# Patient Record
Sex: FEMALE | Race: BLACK OR AFRICAN AMERICAN | NOT HISPANIC OR LATINO | Employment: OTHER | URBAN - NONMETROPOLITAN AREA
[De-identification: names, ages, dates, MRNs, and addresses within clinical notes are randomized per-mention and may not be internally consistent; named-entity substitution may affect disease eponyms.]

---

## 2017-07-27 ENCOUNTER — TRANSCRIBE ORDERS (OUTPATIENT)
Dept: LAB | Facility: HOSPITAL | Age: 67
End: 2017-07-27

## 2017-07-27 ENCOUNTER — LAB (OUTPATIENT)
Dept: LAB | Facility: HOSPITAL | Age: 67
End: 2017-07-27

## 2017-07-27 DIAGNOSIS — Z79.899 DRUG THERAPY: Primary | ICD-10-CM

## 2017-07-27 DIAGNOSIS — Z79.899 DRUG THERAPY: ICD-10-CM

## 2017-07-27 LAB
ALBUMIN SERPL-MCNC: 4.9 G/DL (ref 3.5–5)
ALBUMIN/GLOB SERPL: 1.5 G/DL (ref 1–2)
ALP SERPL-CCNC: 95 U/L (ref 38–126)
ALT SERPL W P-5'-P-CCNC: 27 U/L (ref 13–69)
ANION GAP SERPL CALCULATED.3IONS-SCNC: 16.1 MMOL/L
AST SERPL-CCNC: 24 U/L (ref 15–46)
BASOPHILS # BLD AUTO: 0.03 10*3/MM3 (ref 0–0.2)
BASOPHILS NFR BLD AUTO: 0.6 % (ref 0–2.5)
BILIRUB SERPL-MCNC: 1.9 MG/DL (ref 0.2–1.3)
BUN BLD-MCNC: 14 MG/DL (ref 7–20)
BUN/CREAT SERPL: 17.5 (ref 7.1–23.5)
CALCIUM SPEC-SCNC: 10.3 MG/DL (ref 8.4–10.2)
CHLORIDE SERPL-SCNC: 108 MMOL/L (ref 98–107)
CHOLEST SERPL-MCNC: 232 MG/DL (ref 0–199)
CO2 SERPL-SCNC: 24 MMOL/L (ref 26–30)
CREAT BLD-MCNC: 0.8 MG/DL (ref 0.6–1.3)
DEPRECATED RDW RBC AUTO: 46.6 FL (ref 37–54)
EOSINOPHIL # BLD AUTO: 0.13 10*3/MM3 (ref 0–0.7)
EOSINOPHIL NFR BLD AUTO: 2.7 % (ref 0–7)
ERYTHROCYTE [DISTWIDTH] IN BLOOD BY AUTOMATED COUNT: 14.6 % (ref 11.5–14.5)
GFR SERPL CREATININE-BSD FRML MDRD: 87 ML/MIN/1.73
GLOBULIN UR ELPH-MCNC: 3.2 GM/DL
GLUCOSE BLD-MCNC: 78 MG/DL (ref 74–98)
HCT VFR BLD AUTO: 40.3 % (ref 37–47)
HDLC SERPL-MCNC: 102 MG/DL (ref 40–60)
HGB BLD-MCNC: 12.4 G/DL (ref 12–16)
IMM GRANULOCYTES # BLD: 0.02 10*3/MM3 (ref 0–0.06)
IMM GRANULOCYTES NFR BLD: 0.4 % (ref 0–0.6)
LDLC SERPL CALC-MCNC: 110 MG/DL (ref 0–99)
LDLC/HDLC SERPL: 1.08 {RATIO}
LYMPHOCYTES # BLD AUTO: 1.68 10*3/MM3 (ref 0.6–3.4)
LYMPHOCYTES NFR BLD AUTO: 35.4 % (ref 10–50)
MCH RBC QN AUTO: 26.7 PG (ref 27–31)
MCHC RBC AUTO-ENTMCNC: 30.8 G/DL (ref 30–37)
MCV RBC AUTO: 86.7 FL (ref 81–99)
MONOCYTES # BLD AUTO: 0.48 10*3/MM3 (ref 0–0.9)
MONOCYTES NFR BLD AUTO: 10.1 % (ref 0–12)
NEUTROPHILS # BLD AUTO: 2.41 10*3/MM3 (ref 2–6.9)
NEUTROPHILS NFR BLD AUTO: 50.8 % (ref 37–80)
NRBC BLD MANUAL-RTO: 0 /100 WBC (ref 0–0)
PLATELET # BLD AUTO: 101 10*3/MM3 (ref 130–400)
PMV BLD AUTO: 12.7 FL (ref 6–12)
POTASSIUM BLD-SCNC: 4.1 MMOL/L (ref 3.5–5.1)
PROT SERPL-MCNC: 8.1 G/DL (ref 6.3–8.2)
RBC # BLD AUTO: 4.65 10*6/MM3 (ref 4.2–5.4)
SODIUM BLD-SCNC: 144 MMOL/L (ref 137–145)
TRIGL SERPL-MCNC: 98 MG/DL
VLDLC SERPL-MCNC: 19.6 MG/DL
WBC NRBC COR # BLD: 4.75 10*3/MM3 (ref 4.8–10.8)

## 2017-07-27 PROCEDURE — 85025 COMPLETE CBC W/AUTO DIFF WBC: CPT | Performed by: INTERNAL MEDICINE

## 2017-07-27 PROCEDURE — 80053 COMPREHEN METABOLIC PANEL: CPT | Performed by: INTERNAL MEDICINE

## 2017-07-27 PROCEDURE — 36415 COLL VENOUS BLD VENIPUNCTURE: CPT

## 2017-07-27 PROCEDURE — 80061 LIPID PANEL: CPT | Performed by: INTERNAL MEDICINE

## 2017-11-06 ENCOUNTER — TRANSCRIBE ORDERS (OUTPATIENT)
Dept: LAB | Facility: HOSPITAL | Age: 67
End: 2017-11-06

## 2017-11-06 DIAGNOSIS — Z51.81 ENCOUNTER FOR THERAPEUTIC DRUG MONITORING: Primary | ICD-10-CM

## 2017-11-30 ENCOUNTER — LAB (OUTPATIENT)
Dept: LAB | Facility: HOSPITAL | Age: 67
End: 2017-11-30

## 2017-11-30 DIAGNOSIS — Z51.81 ENCOUNTER FOR THERAPEUTIC DRUG MONITORING: ICD-10-CM

## 2017-11-30 LAB
DEPRECATED RDW RBC AUTO: 50.3 FL (ref 37–54)
ERYTHROCYTE [DISTWIDTH] IN BLOOD BY AUTOMATED COUNT: 15.4 % (ref 11.5–14.5)
HCT VFR BLD AUTO: 37.9 % (ref 37–47)
HGB BLD-MCNC: 11.7 G/DL (ref 12–16)
MCH RBC QN AUTO: 27.5 PG (ref 27–31)
MCHC RBC AUTO-ENTMCNC: 30.9 G/DL (ref 30–37)
MCV RBC AUTO: 89.2 FL (ref 81–99)
PLATELET # BLD AUTO: 234 10*3/MM3 (ref 130–400)
PMV BLD AUTO: 10.8 FL (ref 6–12)
RBC # BLD AUTO: 4.25 10*6/MM3 (ref 4.2–5.4)
WBC NRBC COR # BLD: 6.05 10*3/MM3 (ref 4.8–10.8)

## 2017-11-30 PROCEDURE — 85027 COMPLETE CBC AUTOMATED: CPT

## 2017-11-30 PROCEDURE — 36415 COLL VENOUS BLD VENIPUNCTURE: CPT

## 2017-12-13 ENCOUNTER — TRANSCRIBE ORDERS (OUTPATIENT)
Dept: ADMINISTRATIVE | Facility: HOSPITAL | Age: 67
End: 2017-12-13

## 2017-12-13 DIAGNOSIS — M06.9 RHEUMATOID ARTHRITIS OF KNEE (HCC): Primary | ICD-10-CM

## 2018-01-02 ENCOUNTER — APPOINTMENT (OUTPATIENT)
Dept: LAB | Facility: HOSPITAL | Age: 68
End: 2018-01-02

## 2018-01-02 LAB
ALBUMIN SERPL-MCNC: 4.5 G/DL (ref 3.5–5)
ALP SERPL-CCNC: 82 U/L (ref 38–126)
ALT SERPL W P-5'-P-CCNC: 23 U/L (ref 13–69)
AST SERPL-CCNC: 19 U/L (ref 15–46)
BILIRUB CONJ SERPL-MCNC: 0.2 MG/DL (ref 0–0.4)
BILIRUB INDIRECT SERPL-MCNC: 1.1 MG/DL
BILIRUB SERPL-MCNC: 1.3 MG/DL (ref 0.2–1.3)
PROT SERPL-MCNC: 7.7 G/DL (ref 6.3–8.2)

## 2018-01-02 PROCEDURE — 80076 HEPATIC FUNCTION PANEL: CPT | Performed by: INTERNAL MEDICINE

## 2018-01-02 PROCEDURE — 36415 COLL VENOUS BLD VENIPUNCTURE: CPT | Performed by: INTERNAL MEDICINE

## 2018-06-23 ENCOUNTER — HOSPITAL ENCOUNTER (EMERGENCY)
Facility: HOSPITAL | Age: 68
Discharge: HOME | End: 2018-06-23
Attending: EMERGENCY MEDICINE | Admitting: EMERGENCY MEDICINE

## 2018-06-23 ENCOUNTER — APPOINTMENT (OUTPATIENT)
Dept: GENERAL RADIOLOGY | Facility: HOSPITAL | Age: 68
End: 2018-06-23

## 2018-06-23 ENCOUNTER — APPOINTMENT (OUTPATIENT)
Dept: CT IMAGING | Facility: HOSPITAL | Age: 68
End: 2018-06-23

## 2018-06-23 VITALS
RESPIRATION RATE: 20 BRPM | SYSTOLIC BLOOD PRESSURE: 120 MMHG | HEART RATE: 101 BPM | OXYGEN SATURATION: 98 % | WEIGHT: 135 LBS | DIASTOLIC BLOOD PRESSURE: 90 MMHG | TEMPERATURE: 99.4 F | HEIGHT: 61 IN | BODY MASS INDEX: 25.49 KG/M2

## 2018-06-23 DIAGNOSIS — S16.1XXA CERVICAL STRAIN, ACUTE, INITIAL ENCOUNTER: ICD-10-CM

## 2018-06-23 DIAGNOSIS — R51.9 ACUTE NONINTRACTABLE HEADACHE, UNSPECIFIED HEADACHE TYPE: ICD-10-CM

## 2018-06-23 DIAGNOSIS — S39.012A LUMBAR STRAIN, INITIAL ENCOUNTER: ICD-10-CM

## 2018-06-23 DIAGNOSIS — S63.502A LEFT WRIST SPRAIN, INITIAL ENCOUNTER: ICD-10-CM

## 2018-06-23 DIAGNOSIS — R53.1 GENERALIZED WEAKNESS: ICD-10-CM

## 2018-06-23 DIAGNOSIS — S40.012A CONTUSION OF LEFT SHOULDER, INITIAL ENCOUNTER: ICD-10-CM

## 2018-06-23 DIAGNOSIS — R31.9 URINARY TRACT INFECTION WITH HEMATURIA, SITE UNSPECIFIED: ICD-10-CM

## 2018-06-23 DIAGNOSIS — N39.0 URINARY TRACT INFECTION WITH HEMATURIA, SITE UNSPECIFIED: ICD-10-CM

## 2018-06-23 DIAGNOSIS — S70.02XA CONTUSION OF LEFT HIP, INITIAL ENCOUNTER: ICD-10-CM
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
## 2018-06-23 LAB
ALBUMIN SERPL-MCNC: 3.7 G/DL (ref 3.5–5)
ALBUMIN/GLOB SERPL: 1 G/DL (ref 1–2)
ALP SERPL-CCNC: 85 U/L (ref 38–126)
ALT SERPL W P-5'-P-CCNC: 30 U/L (ref 13–69)
ANION GAP SERPL CALCULATED.3IONS-SCNC: 17.3 MMOL/L (ref 10–20)
AST SERPL-CCNC: 31 U/L (ref 15–46)
BACTERIA UR QL AUTO: ABNORMAL /HPF
BASOPHILS # BLD AUTO: 0.04 10*3/MM3 (ref 0–0.2)
BASOPHILS NFR BLD AUTO: 0.4 % (ref 0–2.5)
BILIRUB SERPL-MCNC: 1.3 MG/DL (ref 0.2–1.3)
BILIRUB UR QL STRIP: NEGATIVE
BUN BLD-MCNC: 18 MG/DL (ref 7–20)
BUN/CREAT SERPL: 25.7 (ref 7.1–23.5)
CALCIUM SPEC-SCNC: 9.5 MG/DL (ref 8.4–10.2)
CHLORIDE SERPL-SCNC: 98 MMOL/L (ref 98–107)
CK SERPL-CCNC: 34 U/L (ref 30–170)
CLARITY UR: ABNORMAL
CO2 SERPL-SCNC: 25 MMOL/L (ref 26–30)
COLOR UR: YELLOW
CREAT BLD-MCNC: 0.7 MG/DL (ref 0.6–1.3)
DEPRECATED RDW RBC AUTO: 44.8 FL (ref 37–54)
EOSINOPHIL # BLD AUTO: 0.21 10*3/MM3 (ref 0–0.7)
EOSINOPHIL NFR BLD AUTO: 2.2 % (ref 0–7)
ERYTHROCYTE [DISTWIDTH] IN BLOOD BY AUTOMATED COUNT: 14.2 % (ref 11.5–14.5)
GFR SERPL CREATININE-BSD FRML MDRD: 101 ML/MIN/1.73
GLOBULIN UR ELPH-MCNC: 3.7 GM/DL
GLUCOSE BLD-MCNC: 88 MG/DL (ref 74–98)
GLUCOSE UR STRIP-MCNC: NEGATIVE MG/DL
HCT VFR BLD AUTO: 31.8 % (ref 37–47)
HGB BLD-MCNC: 10.4 G/DL (ref 12–16)
HGB UR QL STRIP.AUTO: ABNORMAL
HYALINE CASTS UR QL AUTO: ABNORMAL /LPF
IMM GRANULOCYTES # BLD: 0.19 10*3/MM3 (ref 0–0.06)
IMM GRANULOCYTES NFR BLD: 2 % (ref 0–0.6)
KETONES UR QL STRIP: ABNORMAL
LEUKOCYTE ESTERASE UR QL STRIP.AUTO: ABNORMAL
LYMPHOCYTES # BLD AUTO: 1.47 10*3/MM3 (ref 0.6–3.4)
LYMPHOCYTES NFR BLD AUTO: 15.3 % (ref 10–50)
MCH RBC QN AUTO: 28.4 PG (ref 27–31)
MCHC RBC AUTO-ENTMCNC: 32.7 G/DL (ref 30–37)
MCV RBC AUTO: 86.9 FL (ref 81–99)
MONOCYTES # BLD AUTO: 1.05 10*3/MM3 (ref 0–0.9)
MONOCYTES NFR BLD AUTO: 10.9 % (ref 0–12)
MYOGLOBIN SERPL-MCNC: 29.5 NG/ML (ref 0–101)
NEUTROPHILS # BLD AUTO: 6.65 10*3/MM3 (ref 2–6.9)
NEUTROPHILS NFR BLD AUTO: 69.2 % (ref 37–80)
NITRITE UR QL STRIP: POSITIVE
NRBC BLD MANUAL-RTO: 0 /100 WBC (ref 0–0)
PH UR STRIP.AUTO: 5.5 [PH] (ref 5–8)
PLATELET # BLD AUTO: 292 10*3/MM3 (ref 130–400)
PMV BLD AUTO: 9 FL (ref 6–12)
POTASSIUM BLD-SCNC: 4.3 MMOL/L (ref 3.5–5.1)
PROT SERPL-MCNC: 7.4 G/DL (ref 6.3–8.2)
PROT UR QL STRIP: ABNORMAL
RBC # BLD AUTO: 3.66 10*6/MM3 (ref 4.2–5.4)
RBC # UR: ABNORMAL /HPF
REF LAB TEST METHOD: ABNORMAL
SODIUM BLD-SCNC: 136 MMOL/L (ref 137–145)
SP GR UR STRIP: 1.02 (ref 1–1.03)
SQUAMOUS #/AREA URNS HPF: ABNORMAL /HPF
TROPONIN I SERPL-MCNC: <0.012 NG/ML (ref 0–0.03)
UROBILINOGEN UR QL STRIP: ABNORMAL
WBC NRBC COR # BLD: 9.61 10*3/MM3 (ref 4.8–10.8)
WBC UR QL AUTO: ABNORMAL /HPF

## 2018-06-23 PROCEDURE — 81001 URINALYSIS AUTO W/SCOPE: CPT | Performed by: PHYSICIAN ASSISTANT

## 2018-06-23 PROCEDURE — 83874 ASSAY OF MYOGLOBIN: CPT | Performed by: PHYSICIAN ASSISTANT

## 2018-06-23 PROCEDURE — 73030 X-RAY EXAM OF SHOULDER: CPT

## 2018-06-23 PROCEDURE — 72128 CT CHEST SPINE W/O DYE: CPT

## 2018-06-23 PROCEDURE — 96360 HYDRATION IV INFUSION INIT: CPT

## 2018-06-23 PROCEDURE — 99284 EMERGENCY DEPT VISIT MOD MDM: CPT

## 2018-06-23 PROCEDURE — 87077 CULTURE AEROBIC IDENTIFY: CPT | Performed by: PHYSICIAN ASSISTANT

## 2018-06-23 PROCEDURE — 72125 CT NECK SPINE W/O DYE: CPT

## 2018-06-23 PROCEDURE — 80053 COMPREHEN METABOLIC PANEL: CPT | Performed by: PHYSICIAN ASSISTANT

## 2018-06-23 PROCEDURE — 82550 ASSAY OF CK (CPK): CPT | Performed by: PHYSICIAN ASSISTANT

## 2018-06-23 PROCEDURE — 93005 ELECTROCARDIOGRAM TRACING: CPT | Performed by: PHYSICIAN ASSISTANT

## 2018-06-23 PROCEDURE — 85025 COMPLETE CBC W/AUTO DIFF WBC: CPT | Performed by: PHYSICIAN ASSISTANT

## 2018-06-23 PROCEDURE — 87186 SC STD MICRODIL/AGAR DIL: CPT | Performed by: PHYSICIAN ASSISTANT

## 2018-06-23 PROCEDURE — 73130 X-RAY EXAM OF HAND: CPT

## 2018-06-23 PROCEDURE — 73502 X-RAY EXAM HIP UNI 2-3 VIEWS: CPT

## 2018-06-23 PROCEDURE — 71045 X-RAY EXAM CHEST 1 VIEW: CPT

## 2018-06-23 PROCEDURE — 70450 CT HEAD/BRAIN W/O DYE: CPT

## 2018-06-23 PROCEDURE — 84484 ASSAY OF TROPONIN QUANT: CPT | Performed by: PHYSICIAN ASSISTANT

## 2018-06-23 PROCEDURE — 72131 CT LUMBAR SPINE W/O DYE: CPT

## 2018-06-23 PROCEDURE — 87086 URINE CULTURE/COLONY COUNT: CPT | Performed by: PHYSICIAN ASSISTANT

## 2018-06-23 RX ORDER — IBUPROFEN 400 MG/1
600 TABLET ORAL EVERY 8 HOURS PRN
Qty: 20 TABLET | Refills: 0 | OUTPATIENT
Start: 2018-06-23 | End: 2019-12-31 | Stop reason: HOSPADM

## 2018-06-23 RX ORDER — NITROFURANTOIN 25; 75 MG/1; MG/1
100 CAPSULE ORAL 2 TIMES DAILY
Qty: 14 CAPSULE | Refills: 0 | OUTPATIENT
Start: 2018-06-23 | End: 2019-12-31 | Stop reason: HOSPADM

## 2018-06-23 RX ORDER — HYDROCODONE BITARTRATE AND ACETAMINOPHEN 7.5; 325 MG/1; MG/1
1 TABLET ORAL ONCE
Status: COMPLETED | OUTPATIENT
Start: 2018-06-23 | End: 2018-06-23

## 2018-06-23 RX ADMIN — HYDROCODONE BITARTRATE AND ACETAMINOPHEN 1 TABLET: 7.5; 325 TABLET ORAL at 15:59

## 2018-06-23 RX ADMIN — SODIUM CHLORIDE 1000 ML: 9 INJECTION, SOLUTION INTRAVENOUS at 16:10

## 2018-06-25 LAB — BACTERIA SPEC AEROBE CULT: ABNORMAL

## 2019-07-29 ENCOUNTER — TRANSCRIBE ORDERS (OUTPATIENT)
Dept: ADMINISTRATIVE | Facility: HOSPITAL | Age: 69
End: 2019-07-29

## 2019-07-29 DIAGNOSIS — Z12.39 ENCOUNTER FOR SCREENING FOR MALIGNANT NEOPLASM OF BREAST: ICD-10-CM

## 2019-07-29 DIAGNOSIS — M81.8 OSTEOPOROSIS OF DISUSE: Primary | ICD-10-CM

## 2019-09-24 ENCOUNTER — APPOINTMENT (OUTPATIENT)
Dept: BONE DENSITY | Facility: HOSPITAL | Age: 69
End: 2019-09-24

## 2019-09-24 ENCOUNTER — APPOINTMENT (OUTPATIENT)
Dept: MAMMOGRAPHY | Facility: HOSPITAL | Age: 69
End: 2019-09-24

## 2019-11-22 ENCOUNTER — APPOINTMENT (OUTPATIENT)
Dept: BONE DENSITY | Facility: HOSPITAL | Age: 69
End: 2019-11-22

## 2019-11-22 ENCOUNTER — APPOINTMENT (OUTPATIENT)
Dept: MAMMOGRAPHY | Facility: HOSPITAL | Age: 69
End: 2019-11-22

## 2019-12-04 ENCOUNTER — HOSPITAL ENCOUNTER (EMERGENCY)
Facility: HOSPITAL | Age: 69
Discharge: HOME | End: 2019-12-04
Attending: EMERGENCY MEDICINE | Admitting: EMERGENCY MEDICINE

## 2019-12-04 ENCOUNTER — APPOINTMENT (OUTPATIENT)
Dept: GENERAL RADIOLOGY | Facility: HOSPITAL | Age: 69
End: 2019-12-04

## 2019-12-04 VITALS
DIASTOLIC BLOOD PRESSURE: 97 MMHG | SYSTOLIC BLOOD PRESSURE: 172 MMHG | HEART RATE: 92 BPM | OXYGEN SATURATION: 97 % | WEIGHT: 126 LBS | TEMPERATURE: 98.4 F | RESPIRATION RATE: 18 BRPM | BODY MASS INDEX: 23.19 KG/M2 | HEIGHT: 62 IN

## 2019-12-04 DIAGNOSIS — M25.471 PAIN AND SWELLING OF RIGHT ANKLE: Primary | ICD-10-CM

## 2019-12-04 DIAGNOSIS — M25.571 PAIN AND SWELLING OF RIGHT ANKLE: Primary | ICD-10-CM

## 2019-12-04 PROCEDURE — 73610 X-RAY EXAM OF ANKLE: CPT

## 2019-12-04 PROCEDURE — 99283 EMERGENCY DEPT VISIT LOW MDM: CPT

## 2019-12-04 RX ORDER — HYDROCODONE BITARTRATE AND ACETAMINOPHEN 5; 325 MG/1; MG/1
1 TABLET ORAL ONCE
Status: COMPLETED | OUTPATIENT
Start: 2019-12-04 | End: 2019-12-04

## 2019-12-04 RX ORDER — TRAMADOL HYDROCHLORIDE 50 MG/1
50 TABLET ORAL EVERY 6 HOURS PRN
Qty: 10 TABLET | Refills: 0 | OUTPATIENT
Start: 2019-12-04 | End: 2019-12-31 | Stop reason: HOSPADM

## 2019-12-04 RX ADMIN — HYDROCODONE BITARTRATE AND ACETAMINOPHEN 1 TABLET: 5; 325 TABLET ORAL at 21:05

## 2019-12-05 NOTE — ED PROVIDER NOTES
"Subjective   69-year-old female presenting with ankle swelling.  She is HF the last week she has had swelling and pain to her right ankle.  No injury or trauma that she remembers.  She was seen at  where her rheumatologist is the onset of symptoms, was told that she may have a \"hairline fracture, was started on a course of prednisone which seemed to help some but now the pain is increased again.  She denies any fevers, chills, nausea, vomiting or other complaints.  No numbness or weakness.            Review of Systems   Constitutional: Negative.    HENT: Negative.    Eyes: Negative.    Respiratory: Negative.    Cardiovascular: Negative.    Gastrointestinal: Negative.    Genitourinary: Negative.    Musculoskeletal: Positive for arthralgias and joint swelling.   Skin: Negative.    Neurological: Negative.    Psychiatric/Behavioral: Negative.        Past Medical History:   Diagnosis Date   • Arthritis    • Rheumatoid arthritis (CMS/HCC)        Allergies   Allergen Reactions   • Coconut Oil Hives   • Penicillins Itching   • Sulfa Antibiotics Itching       Past Surgical History:   Procedure Laterality Date   • BREAST SURGERY     • HYSTERECTOMY     • SHOULDER SURGERY Right        History reviewed. No pertinent family history.    Social History     Socioeconomic History   • Marital status: Single     Spouse name: Not on file   • Number of children: Not on file   • Years of education: Not on file   • Highest education level: Not on file   Tobacco Use   • Smoking status: Never Smoker   • Smokeless tobacco: Never Used   Substance and Sexual Activity   • Alcohol use: No   • Drug use: No   • Sexual activity: Defer           Objective   Physical Exam   Constitutional: She is oriented to person, place, and time. She appears well-developed and well-nourished. No distress.   HENT:   Head: Normocephalic and atraumatic.   Right Ear: External ear normal.   Left Ear: External ear normal.   Nose: Nose normal.   Mouth/Throat: Oropharynx " is clear and moist.   Eyes: Conjunctivae and EOM are normal. Pupils are equal, round, and reactive to light.   Neck: Normal range of motion. Neck supple.   Cardiovascular: Normal rate, regular rhythm, normal heart sounds and intact distal pulses.   2+ DPs   Pulmonary/Chest: Effort normal and breath sounds normal. No respiratory distress.   Abdominal: Soft. Bowel sounds are normal. She exhibits no distension. There is no tenderness. There is no rebound and no guarding.   Musculoskeletal: Normal range of motion. She exhibits no deformity.   Mild swelling and tenderness to right ankle, all other extremities/joints stable without tenderness   Neurological: She is alert and oriented to person, place, and time.   Normal strength and sensation   Skin: Skin is warm and dry. No rash noted.   No warmth or erythema to the ankle   Psychiatric: She has a normal mood and affect. Her behavior is normal.   Nursing note and vitals reviewed.      Procedures           ED Course            MDM  Number of Diagnoses or Management Options  Pain and swelling of right ankle:   Diagnosis management comments: 69 year old female with subacute ankle pain and swelling.  Well-developed, well-nourished lady in no distress with exam as above.  She is neurovascular intact.  Will repeat x-ray and treat pain.  Disposition pending work-up.    DDX: Musculoskeletal pain, strain, fracture    X-ray per my interpretation reveals chronic changes, no obvious acute abnormality.  I placed her into a boot, she has a walker at home.  Will discharge home with outpatient follow-up.       Amount and/or Complexity of Data Reviewed  Tests in the radiology section of CPT®: reviewed        Final diagnoses:   Pain and swelling of right ankle              Maximus Ramey MD  12/04/19 6695

## 2019-12-18 ENCOUNTER — TRANSCRIBE ORDERS (OUTPATIENT)
Dept: LAB | Facility: HOSPITAL | Age: 69
End: 2019-12-18

## 2019-12-18 ENCOUNTER — LAB (OUTPATIENT)
Dept: LAB | Facility: HOSPITAL | Age: 69
End: 2019-12-18

## 2019-12-18 DIAGNOSIS — M25.472 LEFT ANKLE EFFUSION: Primary | ICD-10-CM

## 2019-12-18 DIAGNOSIS — M25.472 LEFT ANKLE EFFUSION: ICD-10-CM

## 2019-12-18 LAB
ALBUMIN SERPL-MCNC: 3.8 G/DL (ref 3.5–5.2)
ALBUMIN/GLOB SERPL: 0.9 G/DL
ALP SERPL-CCNC: 68 U/L (ref 39–117)
ALT SERPL W P-5'-P-CCNC: 17 U/L (ref 1–33)
ANION GAP SERPL CALCULATED.3IONS-SCNC: 14.9 MMOL/L (ref 5–15)
ANISOCYTOSIS BLD QL: NORMAL
AST SERPL-CCNC: 20 U/L (ref 1–32)
BILIRUB SERPL-MCNC: 0.5 MG/DL (ref 0.2–1.2)
BUN BLD-MCNC: 11 MG/DL (ref 8–23)
BUN/CREAT SERPL: 24.4 (ref 7–25)
CALCIUM SPEC-SCNC: 9.3 MG/DL (ref 8.6–10.5)
CHLORIDE SERPL-SCNC: 107 MMOL/L (ref 98–107)
CHROMATIN AB SERPL-ACNC: 127.6 IU/ML (ref 0–14)
CO2 SERPL-SCNC: 24.1 MMOL/L (ref 22–29)
CREAT BLD-MCNC: 0.45 MG/DL (ref 0.57–1)
CRP SERPL-MCNC: 1.02 MG/DL (ref 0–0.5)
DEPRECATED RDW RBC AUTO: 54.3 FL (ref 37–54)
EOSINOPHIL # BLD MANUAL: 0.1 10*3/MM3 (ref 0–0.4)
EOSINOPHIL NFR BLD MANUAL: 1 % (ref 0.3–6.2)
ERYTHROCYTE [DISTWIDTH] IN BLOOD BY AUTOMATED COUNT: 20.4 % (ref 12.3–15.4)
ERYTHROCYTE [SEDIMENTATION RATE] IN BLOOD: 82 MM/HR (ref 0–30)
GFR SERPL CREATININE-BSD FRML MDRD: >150 ML/MIN/1.73
GLOBULIN UR ELPH-MCNC: 4.2 GM/DL
GLUCOSE BLD-MCNC: 86 MG/DL (ref 65–99)
HCT VFR BLD AUTO: 29.6 % (ref 34–46.6)
HGB BLD-MCNC: 9.3 G/DL (ref 12–15.9)
HYPOCHROMIA BLD QL: NORMAL
LYMPHOCYTES # BLD MANUAL: 2.56 10*3/MM3 (ref 0.7–3.1)
LYMPHOCYTES NFR BLD MANUAL: 25.5 % (ref 19.6–45.3)
LYMPHOCYTES NFR BLD MANUAL: 5.9 % (ref 5–12)
MCH RBC QN AUTO: 23.3 PG (ref 26.6–33)
MCHC RBC AUTO-ENTMCNC: 31.4 G/DL (ref 31.5–35.7)
MCV RBC AUTO: 74.2 FL (ref 79–97)
MICROCYTES BLD QL: NORMAL
MONOCYTES # BLD AUTO: 0.59 10*3/MM3 (ref 0.1–0.9)
NEUTROPHILS # BLD AUTO: 6.77 10*3/MM3 (ref 1.7–7)
NEUTROPHILS NFR BLD MANUAL: 67.6 % (ref 42.7–76)
PLAT MORPH BLD: NORMAL
PLATELET # BLD AUTO: 378 10*3/MM3 (ref 140–450)
PMV BLD AUTO: 9.2 FL (ref 6–12)
POTASSIUM BLD-SCNC: 4.1 MMOL/L (ref 3.5–5.2)
PROT SERPL-MCNC: 8 G/DL (ref 6–8.5)
RBC # BLD AUTO: 3.99 10*6/MM3 (ref 3.77–5.28)
SODIUM BLD-SCNC: 146 MMOL/L (ref 136–145)
URATE SERPL-MCNC: 3.4 MG/DL (ref 2.4–5.7)
WBC MORPH BLD: NORMAL
WBC NRBC COR # BLD: 10.02 10*3/MM3 (ref 3.4–10.8)

## 2019-12-18 PROCEDURE — 85652 RBC SED RATE AUTOMATED: CPT

## 2019-12-18 PROCEDURE — 85007 BL SMEAR W/DIFF WBC COUNT: CPT | Performed by: PHYSICIAN ASSISTANT

## 2019-12-18 PROCEDURE — 86038 ANTINUCLEAR ANTIBODIES: CPT

## 2019-12-18 PROCEDURE — 85025 COMPLETE CBC W/AUTO DIFF WBC: CPT | Performed by: PHYSICIAN ASSISTANT

## 2019-12-18 PROCEDURE — 36415 COLL VENOUS BLD VENIPUNCTURE: CPT

## 2019-12-18 PROCEDURE — 84550 ASSAY OF BLOOD/URIC ACID: CPT

## 2019-12-18 PROCEDURE — 86431 RHEUMATOID FACTOR QUANT: CPT

## 2019-12-18 PROCEDURE — 86140 C-REACTIVE PROTEIN: CPT

## 2019-12-18 PROCEDURE — 80053 COMPREHEN METABOLIC PANEL: CPT | Performed by: PHYSICIAN ASSISTANT

## 2019-12-19 LAB — ANA SER QL: NEGATIVE

## 2019-12-31 ENCOUNTER — APPOINTMENT (OUTPATIENT)
Dept: GENERAL RADIOLOGY | Facility: HOSPITAL | Age: 69
End: 2019-12-31

## 2019-12-31 ENCOUNTER — HOSPITAL ENCOUNTER (EMERGENCY)
Facility: HOSPITAL | Age: 69
Discharge: HOME | End: 2019-12-31
Attending: EMERGENCY MEDICINE | Admitting: EMERGENCY MEDICINE

## 2019-12-31 VITALS
DIASTOLIC BLOOD PRESSURE: 91 MMHG | WEIGHT: 127 LBS | SYSTOLIC BLOOD PRESSURE: 140 MMHG | OXYGEN SATURATION: 98 % | BODY MASS INDEX: 23.37 KG/M2 | HEIGHT: 62 IN | HEART RATE: 78 BPM | RESPIRATION RATE: 18 BRPM | TEMPERATURE: 97.7 F

## 2019-12-31 DIAGNOSIS — M25.512 ARTHRALGIA OF LEFT SHOULDER REGION: Primary | ICD-10-CM

## 2019-12-31 DIAGNOSIS — M19.012 OSTEOARTHRITIS OF LEFT SHOULDER, UNSPECIFIED OSTEOARTHRITIS TYPE: ICD-10-CM

## 2019-12-31 PROCEDURE — 99283 EMERGENCY DEPT VISIT LOW MDM: CPT

## 2019-12-31 PROCEDURE — 63710000001 PREDNISONE PER 1 MG: Performed by: PHYSICIAN ASSISTANT

## 2019-12-31 PROCEDURE — 73030 X-RAY EXAM OF SHOULDER: CPT

## 2019-12-31 RX ORDER — PREDNISONE 20 MG/1
20 TABLET ORAL ONCE
Status: COMPLETED | OUTPATIENT
Start: 2019-12-31 | End: 2019-12-31

## 2019-12-31 RX ORDER — ACETAMINOPHEN 325 MG/1
650 TABLET ORAL ONCE
Status: COMPLETED | OUTPATIENT
Start: 2019-12-31 | End: 2019-12-31

## 2019-12-31 RX ORDER — SENNOSIDES 8.6 MG
650 CAPSULE ORAL EVERY 8 HOURS PRN
Qty: 12 TABLET | Refills: 0 | Status: SHIPPED | OUTPATIENT
Start: 2019-12-31

## 2019-12-31 RX ORDER — PREDNISONE 20 MG/1
20 TABLET ORAL 2 TIMES DAILY
Qty: 6 TABLET | Refills: 0 | Status: SHIPPED | OUTPATIENT
Start: 2019-12-31

## 2019-12-31 RX ADMIN — ACETAMINOPHEN 650 MG: 325 TABLET, FILM COATED ORAL at 11:40

## 2019-12-31 RX ADMIN — PREDNISONE 20 MG: 20 TABLET ORAL at 11:40

## 2020-01-23 ENCOUNTER — APPOINTMENT (OUTPATIENT)
Dept: MAMMOGRAPHY | Facility: HOSPITAL | Age: 70
End: 2020-01-23

## 2020-01-23 ENCOUNTER — APPOINTMENT (OUTPATIENT)
Dept: BONE DENSITY | Facility: HOSPITAL | Age: 70
End: 2020-01-23

## 2020-02-17 ENCOUNTER — HOSPITAL ENCOUNTER (OUTPATIENT)
Dept: MAMMOGRAPHY | Facility: HOSPITAL | Age: 70
Discharge: HOME | End: 2020-02-17
Admitting: INTERNAL MEDICINE

## 2020-02-17 ENCOUNTER — APPOINTMENT (OUTPATIENT)
Dept: BONE DENSITY | Facility: HOSPITAL | Age: 70
End: 2020-02-17

## 2020-02-17 DIAGNOSIS — M81.8 OSTEOPOROSIS OF DISUSE: ICD-10-CM

## 2020-02-17 DIAGNOSIS — Z12.39 ENCOUNTER FOR SCREENING FOR MALIGNANT NEOPLASM OF BREAST: ICD-10-CM

## 2020-02-17 PROCEDURE — 77063 BREAST TOMOSYNTHESIS BI: CPT

## 2020-02-17 PROCEDURE — 77067 SCR MAMMO BI INCL CAD: CPT

## 2020-02-17 PROCEDURE — 77080 DXA BONE DENSITY AXIAL: CPT

## 2020-03-18 ENCOUNTER — NURSING HOME (OUTPATIENT)
Dept: FAMILY MEDICINE CLINIC | Facility: CLINIC | Age: 70
End: 2020-03-18

## 2020-03-18 VITALS
HEART RATE: 82 BPM | BODY MASS INDEX: 21.22 KG/M2 | WEIGHT: 116 LBS | SYSTOLIC BLOOD PRESSURE: 118 MMHG | RESPIRATION RATE: 20 BRPM | TEMPERATURE: 98.5 F | DIASTOLIC BLOOD PRESSURE: 78 MMHG

## 2020-03-18 DIAGNOSIS — J34.89 SINUS PRESSURE: ICD-10-CM

## 2020-03-18 DIAGNOSIS — J34.89 RHINORRHEA: ICD-10-CM

## 2020-03-18 DIAGNOSIS — Z96.642 HISTORY OF TOTAL LEFT HIP ARTHROPLASTY: ICD-10-CM

## 2020-03-18 DIAGNOSIS — Z78.9 IMPAIRED MOBILITY AND ADLS: ICD-10-CM

## 2020-03-18 DIAGNOSIS — S72.052A CLOSED FRACTURE OF HEAD OF LEFT FEMUR, INITIAL ENCOUNTER (HCC): ICD-10-CM

## 2020-03-18 DIAGNOSIS — Z74.09 IMPAIRED MOBILITY AND ADLS: ICD-10-CM

## 2020-03-18 PROCEDURE — 99309 SBSQ NF CARE MODERATE MDM 30: CPT | Performed by: NURSE PRACTITIONER

## 2020-03-19 ENCOUNTER — NURSING HOME (OUTPATIENT)
Dept: INTERNAL MEDICINE | Facility: CLINIC | Age: 70
End: 2020-03-19

## 2020-03-19 VITALS
SYSTOLIC BLOOD PRESSURE: 108 MMHG | BODY MASS INDEX: 21.22 KG/M2 | OXYGEN SATURATION: 99 % | HEART RATE: 84 BPM | TEMPERATURE: 98.7 F | DIASTOLIC BLOOD PRESSURE: 76 MMHG | WEIGHT: 116 LBS | RESPIRATION RATE: 14 BRPM

## 2020-03-19 DIAGNOSIS — M05.79 RHEUMATOID ARTHRITIS INVOLVING MULTIPLE SITES WITH POSITIVE RHEUMATOID FACTOR (HCC): ICD-10-CM

## 2020-03-19 DIAGNOSIS — I60.9 SAH (SUBARACHNOID HEMORRHAGE) (HCC): ICD-10-CM

## 2020-03-19 DIAGNOSIS — M80.00XD AGE-RELATED OSTEOPOROSIS WITH CURRENT PATHOLOGICAL FRACTURE WITH ROUTINE HEALING, SUBSEQUENT ENCOUNTER: ICD-10-CM

## 2020-03-19 DIAGNOSIS — Z96.642 HISTORY OF TOTAL LEFT HIP ARTHROPLASTY: Primary | ICD-10-CM

## 2020-03-19 DIAGNOSIS — S72.052A CLOSED FRACTURE OF HEAD OF LEFT FEMUR, INITIAL ENCOUNTER (HCC): ICD-10-CM

## 2020-03-19 PROCEDURE — 99305 1ST NF CARE MODERATE MDM 35: CPT | Performed by: INTERNAL MEDICINE

## 2020-03-22 PROBLEM — S72.052A CLOSED FRACTURE OF HEAD OF LEFT FEMUR (HCC): Status: ACTIVE | Noted: 2020-03-22

## 2020-03-22 PROBLEM — Z96.642 STATUS POST TOTAL REPLACEMENT OF LEFT HIP: Status: ACTIVE | Noted: 2020-03-22

## 2020-03-22 NOTE — PROGRESS NOTES
Nursing Home Follow Up Note      Alfa Turner DO []   AMADOR Persaud [x]  [address] [place] [address]  Phone: [phone]  Fax: [phone] Monica Adhikari MD []    Caden Mohan DO []   [address] [place] [address]  Phone: [phone]  Fax: [phone]     PATIENT NAME: Aron Jacobsen                                                                          YOB: 1950           DATE OF SERVICE: 03/18/2020  FACILITY:  [][facility]   [] [place]   [] [facility]   [x] [facility]   [] Other ______________________________________________________________________      CHIEF COMPLAINT:  Need stop date for Lovenox    Sinus pressure      HISTORY OF PRESENT ILLNESS:   Patient is here for PT and rehabilitation, s/p left TH arthroplasty, on 3/5, secondary to a left femoral neck fracture, following a fall. She has a history or RA, OA, IBS, Osteoporosis and Vitamin D deficiency. Staff and patient would like to know when she can stop Lovenox injections. She is progressing well with PT, is ambulatory.     Patient has complaints today of sinus pressure and rhinorrhea for the past couple days.    PAST MEDICAL & SURGICAL HISTORY:   Past Medical History:   Diagnosis Date   • Anemia    • Arthritis    • Baker's cyst    • Chronic deep vein thrombosis (DVT) of left upper extremity (CMS/HCC)    • IBS (irritable colon syndrome)    • Osteoporosis    • Rheumatoid arthritis (CMS/HCC)    • Vitamin D deficiency       Past Surgical History:   Procedure Laterality Date   • BREAST BIOPSY     • BREAST SURGERY     • HYSTERECTOMY     • SHOULDER SURGERY Right    • VERTEBROPLASTY           MEDICATIONS:  I have reviewed and reconciled the patients medication list in the patients chart at the skilled nursing facility today.      ALLERGIES:    Allergies   Allergen Reactions   • Coconut Oil Hives   • Hctz [Hydrochlorothiazide] Other (See Comments)     NH DOCUMENTATION   • Penicillins Itching   • Sulfa  Antibiotics Itching         SOCIAL HISTORY:    Social History     Socioeconomic History   • Marital status: Single     Spouse name: Not on file   • Number of children: Not on file   • Years of education: Not on file   • Highest education level: 8th grade   Tobacco Use   • Smoking status: Never Smoker   • Smokeless tobacco: Never Used   Substance and Sexual Activity   • Alcohol use: No   • Drug use: No   • Sexual activity: Defer       FAMILY HISTORY:    Family History   Problem Relation Age of Onset   • Lung cancer Mother    • Breast cancer Neg Hx        REVIEW OF SYSTEMS:    Review of Systems   Constitutional: Negative for activity change, appetite change, chills, diaphoresis, fatigue, fever, unexpected weight gain and unexpected weight loss.   HENT: Positive for rhinorrhea and sinus pressure. Negative for congestion, ear pain, mouth sores, nosebleeds, postnasal drip, sneezing, sore throat, swollen glands and trouble swallowing.    Eyes: Negative for blurred vision, pain, discharge, redness, itching and visual disturbance.   Respiratory: Negative for apnea, cough, choking, chest tightness, shortness of breath, wheezing and stridor.    Cardiovascular: Negative for chest pain, palpitations and leg swelling.   Gastrointestinal: Negative for abdominal distention, abdominal pain, blood in stool, constipation, diarrhea, nausea, vomiting, GERD and indigestion.   Endocrine: Negative for polydipsia and polyuria.   Genitourinary: Negative for decreased urine volume, difficulty urinating, dysuria, flank pain, frequency, hematuria, urgency and urinary incontinence.   Musculoskeletal: Positive for arthralgias and gait problem. Negative for back pain, joint swelling and myalgias.   Skin: Negative for color change, dry skin, rash and skin lesions.   Allergic/Immunologic: Negative for environmental allergies.   Neurological: Negative for dizziness, seizures, speech difficulty, weakness, memory problem and confusion.      Psychiatric/Behavioral: Negative for behavioral problems, dysphoric mood, hallucinations, sleep disturbance and depressed mood. The patient is not nervous/anxious.          PHYSICAL EXAMINATION:   VITAL SIGNS:   Vitals:    03/18/20 1447   BP: 118/78   Pulse: 82   Resp: 20   Temp: 98.5 °F (36.9 °C)       Physical Exam   Constitutional: She appears well-developed and well-nourished.   HENT:   Head: Normocephalic.   Right Ear: External ear normal.   Left Ear: External ear normal.   Nose: Nose normal.   Eyes: Conjunctivae are normal.   Neck: Normal range of motion.   Cardiovascular: Normal rate, regular rhythm, normal heart sounds and intact distal pulses.   Pulmonary/Chest: Effort normal and breath sounds normal. No respiratory distress. She has no wheezes. She has no rales.   Abdominal: Soft. Bowel sounds are normal. She exhibits no distension and no mass. There is no tenderness. No hernia.   Musculoskeletal: She exhibits no edema.        Left hip: She exhibits decreased range of motion.   S/p left femoral head fracture THR   Neurological: She is alert.   Skin: Skin is warm and dry. No rash noted.   Psychiatric: She has a normal mood and affect. Her behavior is normal.   Nursing note and vitals reviewed.      RECORDS REVIEW:   I have reviewed and interpreted hospital records and labs in [facility], from recent surgery    Advance Care Planning   ACP discussion was held with the patient during this visit. Patient has an advance directive in EMR which is still valid.      ASSESSMENT     Diagnoses and all orders for this visit:    History of total left hip arthroplasty    Closed fracture of head of left femur, initial encounter (CMS/[facility])    Impaired mobility and ADLs    Sinus pressure    Rhinorrhea        PLAN    Patient progressing well with PT, s/p her left TH arthroplasty, pain is well controlled. She is mobile, ambulating every day. Ok to stop Lovenox, she is 13 days post op. Staff to continue supportive care for all  ADLs.     Start Claritin D q day x 5 days, for sinus pressure and rhinorrhea. She refused treatment with Flonase.       [x]  Discussed Patient in detail with nursing/staff, addressed all needs today.     [x]  Plan of Care Reviewed   [x]  PT/OT Reviewed   [x]  Order Changes  []  Discharge Plans Reviewed  [x]  Advance Directive on file with Nursing Home.   [x]  POA on file with Nursing Home.   [x]  Code Status listed: [x]  Full Code   []  DNR              Allison Lopez, AMADOR.

## 2020-03-23 NOTE — PROGRESS NOTES
Nursing Home Progress Note        Alfa Turner DO ?  AMADOR Persaud ?  [address], [place] [address]  Phone: [phone]  Fax: [phone] Monica Adhikari MD ?  Caden Mohan DO [x]   [address] [place] [address]  Phone: [phone]  Fax: [phone]     PATIENT NAME: Aron Jacobsen                                                                          YOB: 1950           DATE OF SERVICE: 03/19/2020  FACILITY:  [] [place]   [] [place]   [] [facility]   [x] [facility]  []  [facility]  [] Other ______________________________________________________________________     CHIEF COMPLAINT:   Fall with SAH and L femoral neck fracture      HISTORY OF PRESENT ILLNESS:   Patient is a pleasant 70-year-old -American female with a history of rheumatoid arthritis on Humira, history of left total knee replacement, osteoporosis, and IBS who was recently hospitalized at [facility] for fall with resultant subarachnoid hemorrhage as well as left femoral neck fracture.  Patient underwent internal fixation on 3/5/2020 with good results.  Patient was transferred to the [facility] for strengthening.  She has been appropriate with staff and has been recovering quite well.  She anticipates discharge on Friday.  She has no new complaints or concerns.  Medical history was reviewed and discussed with the patient.    PAST MEDICAL & SURGICAL HISTORY:   Past Medical History:   Diagnosis Date   • Anemia    • Arthritis    • Baker's cyst    • Chronic deep vein thrombosis (DVT) of left upper extremity (CMS/HCC)    • IBS (irritable colon syndrome)    • Osteoporosis    • Rheumatoid arthritis (CMS/HCC)    • Vitamin D deficiency       Past Surgical History:   Procedure Laterality Date   • BREAST BIOPSY     • BREAST SURGERY     • HYSTERECTOMY     • SHOULDER SURGERY Right    • VERTEBROPLASTY           MEDICATIONS:  I have reviewed and reconciled the patients  medication list in the patients chart at the skilled nursing facility today.      ALLERGIES:  Allergies   Allergen Reactions   • Coconut Oil Hives   • Hctz [Hydrochlorothiazide] Other (See Comments)     NH DOCUMENTATION   • Penicillins Itching   • Sulfa Antibiotics Itching         SOCIAL HISTORY:  Social History     Socioeconomic History   • Marital status: Single     Spouse name: Not on file   • Number of children: Not on file   • Years of education: Not on file   • Highest education level: 8th grade   Tobacco Use   • Smoking status: Never Smoker   • Smokeless tobacco: Never Used   Substance and Sexual Activity   • Alcohol use: No   • Drug use: No   • Sexual activity: Defer       FAMILY HISTORY:  Family History   Problem Relation Age of Onset   • Lung cancer Mother    • Breast cancer Neg Hx        REVIEW OF SYSTEMS:  Review of Systems   Constitutional: Negative for chills, fatigue and fever.   HENT: Negative for congestion, ear pain, rhinorrhea, sinus pressure and sore throat.    Eyes: Negative for visual disturbance.   Respiratory: Negative for cough, chest tightness, shortness of breath and wheezing.    Cardiovascular: Negative for chest pain, palpitations and leg swelling.   Gastrointestinal: Negative for abdominal pain, blood in stool, constipation, diarrhea, nausea and vomiting.   Endocrine: Negative for polydipsia and polyuria.   Genitourinary: Negative for dysuria and hematuria.   Musculoskeletal: Negative for arthralgias and back pain.   Skin: Negative for rash.   Neurological: Negative for dizziness, light-headedness, numbness and headaches.   Psychiatric/Behavioral: Negative for dysphoric mood and sleep disturbance. The patient is not nervous/anxious.           PHYSICAL EXAMINATION:     VITAL SIGNS:  /76   Pulse 84   Temp 98.7 °F (37.1 °C)   Resp 14   Wt 52.6 kg (116 lb)   SpO2 99%   BMI 21.22 kg/m²     Physical Exam   Constitutional: She is oriented to person, place, and time. She appears  well-developed and well-nourished.   HENT:   Head: Normocephalic and atraumatic.   Mouth/Throat: Oropharynx is clear and moist. No oropharyngeal exudate.   Eyes: Pupils are equal, round, and reactive to light. Conjunctivae and EOM are normal. No scleral icterus.   Neck: Normal range of motion. Neck supple. No thyromegaly present.   Cardiovascular: Normal rate, regular rhythm and normal heart sounds. Exam reveals no gallop and no friction rub.   No murmur heard.  Pulmonary/Chest: Effort normal and breath sounds normal. No respiratory distress. She has no wheezes.   Abdominal: Soft. Bowel sounds are normal. She exhibits no distension. There is no tenderness.   Musculoskeletal: Normal range of motion.   Well healing hip wound, minimal swelling   Lymphadenopathy:     She has no cervical adenopathy.   Neurological: She is alert and oriented to person, place, and time.   Skin: Skin is warm and dry. No rash noted.   Psychiatric: She has a normal mood and affect. Her behavior is normal.   Nursing note and vitals reviewed.      RECORDS REVIEW:   [facility] discharge summary 3/10/20     ASSESSMENT     Diagnoses and all orders for this visit:    History of total left hip arthroplasty    Closed fracture of head of left femur, initial encounter (CMS/[facility])    SAH (subarachnoid hemorrhage) (CMS/[facility])    Age-related osteoporosis with current pathological fracture with routine healing, subsequent encounter    Rheumatoid arthritis involving multiple sites with positive rheumatoid factor (CMS/[facility])        PLAN    71 yo AA F admitted to the [facility] for strengthening following fall    Left Femoral Neck fracture  - [facility] 3/5/20 at [facility]  - follow up with orthopedics as planned.   - on lovenox for 5 weeks following surgery (end April 7th)    SAH  - a result of her fall, no changes in mental status since admission, ok to complete lovenox as planned.     Osteoporosis  - to establish with bone clinic.     RA  - on humira, to restart  Humira next Thrusday.     Discharge planning was reviewed with the patient today as well.  Her medication regimen is appropriate.  She will need home health services for PT/OT upon discharge.     [x]  Discussed Patient in detail with nursing/staff, addressed all needs today.     [x]  Plan of Care Reviewed   [x]  PT/OT Reviewed   []  Order Changes  [x]  Discharge Plans Reviewed  [x]  Advance Directive on file with Nursing Home.   [x]  POA on file with Nursing Home.    [x]  Code Status listed and reviewed.          Caden Mohan DO.  3/23/2020

## 2020-04-01 ENCOUNTER — OUTSIDE FACILITY SERVICE (OUTPATIENT)
Dept: INTERNAL MEDICINE | Facility: CLINIC | Age: 70
End: 2020-04-01

## 2020-04-01 PROCEDURE — G0180 MD CERTIFICATION HHA PATIENT: HCPCS | Performed by: INTERNAL MEDICINE

## 2020-05-22 ENCOUNTER — TELEPHONE (OUTPATIENT)
Dept: INTERNAL MEDICINE | Facility: CLINIC | Age: 70
End: 2020-05-22

## 2021-07-20 ENCOUNTER — TRANSCRIBE ORDERS (OUTPATIENT)
Dept: GENERAL RADIOLOGY | Facility: HOSPITAL | Age: 71
End: 2021-07-20

## 2021-07-20 ENCOUNTER — HOSPITAL ENCOUNTER (OUTPATIENT)
Dept: GENERAL RADIOLOGY | Facility: HOSPITAL | Age: 71
Discharge: HOME | End: 2021-07-20
Admitting: INTERNAL MEDICINE

## 2021-07-20 DIAGNOSIS — M05.79 RHEUMATOID ARTHRITIS WITH RHEUMATOID FACTOR OF MULTIPLE SITES WITHOUT ORGAN OR SYSTEMS INVOLVEMENT (HCC): Primary | ICD-10-CM

## 2021-07-20 DIAGNOSIS — M79.641 PAIN IN BOTH HANDS: ICD-10-CM

## 2021-07-20 DIAGNOSIS — M79.642 PAIN IN BOTH HANDS: ICD-10-CM

## 2021-07-20 DIAGNOSIS — M05.79 RHEUMATOID ARTHRITIS WITH RHEUMATOID FACTOR OF MULTIPLE SITES WITHOUT ORGAN OR SYSTEMS INVOLVEMENT (HCC): ICD-10-CM

## 2021-07-20 PROCEDURE — 73120 X-RAY EXAM OF HAND: CPT

## 2023-06-07 ENCOUNTER — TRANSCRIBE ORDERS (OUTPATIENT)
Dept: ADMINISTRATIVE | Facility: HOSPITAL | Age: 73
End: 2023-06-07

## 2023-06-13 ENCOUNTER — TRANSCRIBE ORDERS (OUTPATIENT)
Dept: ADMINISTRATIVE | Facility: HOSPITAL | Age: 73
End: 2023-06-13
Payer: MEDICARE

## 2023-06-13 DIAGNOSIS — R22.2 LOCALIZED SWELLING, MASS AND LUMP, TRUNK: Primary | ICD-10-CM

## 2023-09-07 ENCOUNTER — HOSPITAL ENCOUNTER (INPATIENT)
Facility: HOSPITAL | Age: 73
LOS: 2 days | Discharge: ANOTHER HOSPITAL | DRG: 291 | End: 2023-09-09
Attending: STUDENT IN AN ORGANIZED HEALTH CARE EDUCATION/TRAINING PROGRAM | Admitting: STUDENT IN AN ORGANIZED HEALTH CARE EDUCATION/TRAINING PROGRAM
Payer: MEDICARE

## 2023-09-07 ENCOUNTER — APPOINTMENT (OUTPATIENT)
Dept: CARDIOLOGY | Facility: HOSPITAL | Age: 73
DRG: 291 | End: 2023-09-07
Payer: MEDICARE

## 2023-09-07 ENCOUNTER — APPOINTMENT (OUTPATIENT)
Dept: GENERAL RADIOLOGY | Facility: HOSPITAL | Age: 73
DRG: 291 | End: 2023-09-07
Payer: MEDICARE

## 2023-09-07 DIAGNOSIS — E87.70 HYPERVOLEMIA, UNSPECIFIED HYPERVOLEMIA TYPE: Primary | ICD-10-CM

## 2023-09-07 LAB
ALBUMIN SERPL-MCNC: 3.9 G/DL (ref 3.5–5.2)
ALBUMIN/GLOB SERPL: 1.2 G/DL
ALP SERPL-CCNC: 172 U/L (ref 39–117)
ALT SERPL W P-5'-P-CCNC: 22 U/L (ref 1–33)
ANION GAP SERPL CALCULATED.3IONS-SCNC: 23.7 MMOL/L (ref 5–15)
AST SERPL-CCNC: 37 U/L (ref 1–32)
BASOPHILS # BLD AUTO: 0.02 10*3/MM3 (ref 0–0.2)
BASOPHILS NFR BLD AUTO: 0.2 % (ref 0–1.5)
BILIRUB SERPL-MCNC: 2 MG/DL (ref 0–1.2)
BUN SERPL-MCNC: 47 MG/DL (ref 8–23)
BUN/CREAT SERPL: 33.1 (ref 7–25)
CALCIUM SPEC-SCNC: 10 MG/DL (ref 8.6–10.5)
CHLORIDE SERPL-SCNC: 98 MMOL/L (ref 98–107)
CO2 SERPL-SCNC: 11.3 MMOL/L (ref 22–29)
CREAT SERPL-MCNC: 1.42 MG/DL (ref 0.57–1)
CRP SERPL-MCNC: 8.19 MG/DL (ref 0–0.5)
DEPRECATED RDW RBC AUTO: 50.6 FL (ref 37–54)
EGFRCR SERPLBLD CKD-EPI 2021: 39.4 ML/MIN/1.73
EOSINOPHIL # BLD AUTO: 0.01 10*3/MM3 (ref 0–0.4)
EOSINOPHIL NFR BLD AUTO: 0.1 % (ref 0.3–6.2)
ERYTHROCYTE [DISTWIDTH] IN BLOOD BY AUTOMATED COUNT: 17.2 % (ref 12.3–15.4)
FLUAV SUBTYP SPEC NAA+PROBE: NOT DETECTED
FLUBV RNA ISLT QL NAA+PROBE: NOT DETECTED
GEN 5 2HR TROPONIN T REFLEX: 71 NG/L
GLOBULIN UR ELPH-MCNC: 3.3 GM/DL
GLUCOSE SERPL-MCNC: 109 MG/DL (ref 65–99)
HCT VFR BLD AUTO: 49.2 % (ref 34–46.6)
HGB BLD-MCNC: 15.9 G/DL (ref 12–15.9)
IMM GRANULOCYTES # BLD AUTO: 0.08 10*3/MM3 (ref 0–0.05)
IMM GRANULOCYTES NFR BLD AUTO: 0.6 % (ref 0–0.5)
LYMPHOCYTES # BLD AUTO: 0.73 10*3/MM3 (ref 0.7–3.1)
LYMPHOCYTES NFR BLD AUTO: 5.8 % (ref 19.6–45.3)
MAGNESIUM SERPL-MCNC: 2.1 MG/DL (ref 1.6–2.4)
MCH RBC QN AUTO: 27.3 PG (ref 26.6–33)
MCHC RBC AUTO-ENTMCNC: 32.3 G/DL (ref 31.5–35.7)
MCV RBC AUTO: 84.4 FL (ref 79–97)
MONOCYTES # BLD AUTO: 1.14 10*3/MM3 (ref 0.1–0.9)
MONOCYTES NFR BLD AUTO: 9 % (ref 5–12)
NEUTROPHILS NFR BLD AUTO: 10.65 10*3/MM3 (ref 1.7–7)
NEUTROPHILS NFR BLD AUTO: 84.3 % (ref 42.7–76)
NRBC BLD AUTO-RTO: 0 /100 WBC (ref 0–0.2)
NT-PROBNP SERPL-MCNC: 1515 PG/ML (ref 0–900)
PLATELET # BLD AUTO: 52 10*3/MM3 (ref 140–450)
PMV BLD AUTO: 10.9 FL (ref 6–12)
POTASSIUM SERPL-SCNC: 5.3 MMOL/L (ref 3.5–5.2)
PROCALCITONIN SERPL-MCNC: 0.7 NG/ML (ref 0–0.25)
PROT SERPL-MCNC: 7.2 G/DL (ref 6–8.5)
RBC # BLD AUTO: 5.83 10*6/MM3 (ref 3.77–5.28)
RBC MORPH BLD: NORMAL
SARS-COV-2 RNA RESP QL NAA+PROBE: NOT DETECTED
SMALL PLATELETS BLD QL SMEAR: NORMAL
SODIUM SERPL-SCNC: 133 MMOL/L (ref 136–145)
TROPONIN T DELTA: 3 NG/L
TROPONIN T SERPL HS-MCNC: 68 NG/L
WBC MORPH BLD: NORMAL
WBC NRBC COR # BLD: 12.63 10*3/MM3 (ref 3.4–10.8)

## 2023-09-07 PROCEDURE — 83735 ASSAY OF MAGNESIUM: CPT | Performed by: STUDENT IN AN ORGANIZED HEALTH CARE EDUCATION/TRAINING PROGRAM

## 2023-09-07 PROCEDURE — 80053 COMPREHEN METABOLIC PANEL: CPT | Performed by: STUDENT IN AN ORGANIZED HEALTH CARE EDUCATION/TRAINING PROGRAM

## 2023-09-07 PROCEDURE — 93005 ELECTROCARDIOGRAM TRACING: CPT | Performed by: STUDENT IN AN ORGANIZED HEALTH CARE EDUCATION/TRAINING PROGRAM

## 2023-09-07 PROCEDURE — 25010000002 HEPARIN (PORCINE) PER 1000 UNITS: Performed by: STUDENT IN AN ORGANIZED HEALTH CARE EDUCATION/TRAINING PROGRAM

## 2023-09-07 PROCEDURE — 25010000002 CEFTRIAXONE SODIUM-DEXTROSE 1-3.74 GM-%(50ML) RECONSTITUTED SOLUTION: Performed by: STUDENT IN AN ORGANIZED HEALTH CARE EDUCATION/TRAINING PROGRAM

## 2023-09-07 PROCEDURE — 93306 TTE W/DOPPLER COMPLETE: CPT

## 2023-09-07 PROCEDURE — 84484 ASSAY OF TROPONIN QUANT: CPT | Performed by: STUDENT IN AN ORGANIZED HEALTH CARE EDUCATION/TRAINING PROGRAM

## 2023-09-07 PROCEDURE — 99285 EMERGENCY DEPT VISIT HI MDM: CPT

## 2023-09-07 PROCEDURE — 25010000002 AZITHROMYCIN PER 500 MG: Performed by: STUDENT IN AN ORGANIZED HEALTH CARE EDUCATION/TRAINING PROGRAM

## 2023-09-07 PROCEDURE — 25010000002 ONDANSETRON PER 1 MG: Performed by: STUDENT IN AN ORGANIZED HEALTH CARE EDUCATION/TRAINING PROGRAM

## 2023-09-07 PROCEDURE — 85007 BL SMEAR W/DIFF WBC COUNT: CPT | Performed by: STUDENT IN AN ORGANIZED HEALTH CARE EDUCATION/TRAINING PROGRAM

## 2023-09-07 PROCEDURE — 86140 C-REACTIVE PROTEIN: CPT | Performed by: STUDENT IN AN ORGANIZED HEALTH CARE EDUCATION/TRAINING PROGRAM

## 2023-09-07 PROCEDURE — 85025 COMPLETE CBC W/AUTO DIFF WBC: CPT | Performed by: STUDENT IN AN ORGANIZED HEALTH CARE EDUCATION/TRAINING PROGRAM

## 2023-09-07 PROCEDURE — 87636 SARSCOV2 & INF A&B AMP PRB: CPT | Performed by: STUDENT IN AN ORGANIZED HEALTH CARE EDUCATION/TRAINING PROGRAM

## 2023-09-07 PROCEDURE — 84145 PROCALCITONIN (PCT): CPT | Performed by: STUDENT IN AN ORGANIZED HEALTH CARE EDUCATION/TRAINING PROGRAM

## 2023-09-07 PROCEDURE — 25010000002 FUROSEMIDE PER 20 MG: Performed by: STUDENT IN AN ORGANIZED HEALTH CARE EDUCATION/TRAINING PROGRAM

## 2023-09-07 PROCEDURE — 93306 TTE W/DOPPLER COMPLETE: CPT | Performed by: INTERNAL MEDICINE

## 2023-09-07 PROCEDURE — 99223 1ST HOSP IP/OBS HIGH 75: CPT | Performed by: STUDENT IN AN ORGANIZED HEALTH CARE EDUCATION/TRAINING PROGRAM

## 2023-09-07 PROCEDURE — 83880 ASSAY OF NATRIURETIC PEPTIDE: CPT | Performed by: STUDENT IN AN ORGANIZED HEALTH CARE EDUCATION/TRAINING PROGRAM

## 2023-09-07 PROCEDURE — 87040 BLOOD CULTURE FOR BACTERIA: CPT | Performed by: STUDENT IN AN ORGANIZED HEALTH CARE EDUCATION/TRAINING PROGRAM

## 2023-09-07 PROCEDURE — 36415 COLL VENOUS BLD VENIPUNCTURE: CPT

## 2023-09-07 PROCEDURE — 71045 X-RAY EXAM CHEST 1 VIEW: CPT

## 2023-09-07 RX ORDER — BISACODYL 5 MG/1
5 TABLET, DELAYED RELEASE ORAL DAILY PRN
Status: DISCONTINUED | OUTPATIENT
Start: 2023-09-07 | End: 2023-09-09 | Stop reason: HOSPADM

## 2023-09-07 RX ORDER — ONDANSETRON 2 MG/ML
4 INJECTION INTRAMUSCULAR; INTRAVENOUS EVERY 6 HOURS PRN
Status: DISCONTINUED | OUTPATIENT
Start: 2023-09-07 | End: 2023-09-09 | Stop reason: HOSPADM

## 2023-09-07 RX ORDER — CEFTRIAXONE 1 G/50ML
1000 INJECTION, SOLUTION INTRAVENOUS ONCE
Status: COMPLETED | OUTPATIENT
Start: 2023-09-07 | End: 2023-09-07

## 2023-09-07 RX ORDER — ACETAMINOPHEN 160 MG/5ML
650 SOLUTION ORAL EVERY 4 HOURS PRN
Status: DISCONTINUED | OUTPATIENT
Start: 2023-09-07 | End: 2023-09-09 | Stop reason: HOSPADM

## 2023-09-07 RX ORDER — ACETAMINOPHEN 325 MG/1
650 TABLET ORAL EVERY 4 HOURS PRN
Status: DISCONTINUED | OUTPATIENT
Start: 2023-09-07 | End: 2023-09-09 | Stop reason: HOSPADM

## 2023-09-07 RX ORDER — FUROSEMIDE 10 MG/ML
40 INJECTION INTRAMUSCULAR; INTRAVENOUS
Status: DISCONTINUED | OUTPATIENT
Start: 2023-09-08 | End: 2023-09-08

## 2023-09-07 RX ORDER — SODIUM CHLORIDE 9 MG/ML
40 INJECTION, SOLUTION INTRAVENOUS AS NEEDED
Status: DISCONTINUED | OUTPATIENT
Start: 2023-09-07 | End: 2023-09-09 | Stop reason: HOSPADM

## 2023-09-07 RX ORDER — BISACODYL 10 MG
10 SUPPOSITORY, RECTAL RECTAL DAILY PRN
Status: DISCONTINUED | OUTPATIENT
Start: 2023-09-07 | End: 2023-09-09 | Stop reason: HOSPADM

## 2023-09-07 RX ORDER — POLYETHYLENE GLYCOL 3350 17 G/17G
17 POWDER, FOR SOLUTION ORAL DAILY PRN
Status: DISCONTINUED | OUTPATIENT
Start: 2023-09-07 | End: 2023-09-09 | Stop reason: HOSPADM

## 2023-09-07 RX ORDER — NITROGLYCERIN 0.4 MG/1
0.4 TABLET SUBLINGUAL
Status: DISCONTINUED | OUTPATIENT
Start: 2023-09-07 | End: 2023-09-09 | Stop reason: HOSPADM

## 2023-09-07 RX ORDER — FUROSEMIDE 10 MG/ML
40 INJECTION INTRAMUSCULAR; INTRAVENOUS ONCE
Status: COMPLETED | OUTPATIENT
Start: 2023-09-07 | End: 2023-09-07

## 2023-09-07 RX ORDER — AMOXICILLIN 250 MG
2 CAPSULE ORAL 2 TIMES DAILY
Status: DISCONTINUED | OUTPATIENT
Start: 2023-09-07 | End: 2023-09-09 | Stop reason: HOSPADM

## 2023-09-07 RX ORDER — ACETAMINOPHEN 650 MG/1
650 SUPPOSITORY RECTAL EVERY 4 HOURS PRN
Status: DISCONTINUED | OUTPATIENT
Start: 2023-09-07 | End: 2023-09-09 | Stop reason: HOSPADM

## 2023-09-07 RX ORDER — SODIUM CHLORIDE 0.9 % (FLUSH) 0.9 %
10 SYRINGE (ML) INJECTION EVERY 12 HOURS SCHEDULED
Status: DISCONTINUED | OUTPATIENT
Start: 2023-09-07 | End: 2023-09-09 | Stop reason: HOSPADM

## 2023-09-07 RX ORDER — CEFTRIAXONE 1 G/50ML
1000 INJECTION, SOLUTION INTRAVENOUS EVERY 24 HOURS
Status: DISCONTINUED | OUTPATIENT
Start: 2023-09-08 | End: 2023-09-08

## 2023-09-07 RX ORDER — HEPARIN SODIUM 5000 [USP'U]/ML
5000 INJECTION, SOLUTION INTRAVENOUS; SUBCUTANEOUS EVERY 12 HOURS SCHEDULED
Status: DISCONTINUED | OUTPATIENT
Start: 2023-09-07 | End: 2023-09-08

## 2023-09-07 RX ORDER — CELECOXIB 200 MG/1
200 CAPSULE ORAL DAILY
COMMUNITY

## 2023-09-07 RX ORDER — SODIUM CHLORIDE 0.9 % (FLUSH) 0.9 %
10 SYRINGE (ML) INJECTION AS NEEDED
Status: DISCONTINUED | OUTPATIENT
Start: 2023-09-07 | End: 2023-09-09 | Stop reason: HOSPADM

## 2023-09-07 RX ADMIN — FUROSEMIDE 40 MG: 10 INJECTION, SOLUTION INTRAMUSCULAR; INTRAVENOUS at 15:13

## 2023-09-07 RX ADMIN — ACETAMINOPHEN 650 MG: 325 TABLET, FILM COATED ORAL at 20:44

## 2023-09-07 RX ADMIN — SODIUM CHLORIDE 500 MG: 900 INJECTION, SOLUTION INTRAVENOUS at 18:38

## 2023-09-07 RX ADMIN — CEFTRIAXONE 1000 MG: 1 INJECTION, SOLUTION INTRAVENOUS at 16:23

## 2023-09-07 RX ADMIN — ONDANSETRON 4 MG: 2 INJECTION INTRAMUSCULAR; INTRAVENOUS at 17:18

## 2023-09-07 RX ADMIN — HEPARIN SODIUM 5000 UNITS: 5000 INJECTION INTRAVENOUS; SUBCUTANEOUS at 21:00

## 2023-09-07 NOTE — H&P
[facility]      Name:  Aron Jacobsen   Age:  73 y.o.  Sex:  female  :  1950  MRN:  [id number]   Visit Number:  [id number]  Admission Date:  2023  Date Of Service:  23  Primary Care Physician:  Diane Good MD    Chief Complaint:     Shortness of air, bilateral lower extremity pitting edema    History Of Presenting Illness:      Patient is a 73-year-old woman, lives at home alone, normally can ambulate but does not drive, with a past medical history of hypertension, rheumatoid arthritis, migraines.  Was evaluated in 2023 at  for bilateral lower extremity swelling, venous duplex was normal at that time, she was encouraged to elevate and use compression stockings.  Presented to [facility] ED on 2023 with concern for bilateral pedal edema, abdominal discomfort, diaphoresis, back pain, reportedly increasingly worse over several weeks.  Her shortness of breath in particular is worse in the past 4 to 5 days.  Denied any fevers or chills, vomiting.    ED summary: Afebrile, vital signs stable on 3 L nasal cannula.  EKG sinus tachycardia rate 118, no ST elevations or depressions.  High-sensitivity troponin 68, ACS not initially suspected.  proBNP over 1500.  Sodium 133, potassium 5.3, CO2 11, anion gap 23, BUN 47, creatinine 1.42 with most recent 0.45 in 2019, CRP 8.19, procalcitonin 0.70, white count 12, hemoglobin 15.9.  COVID/flu negative.  CXR mild congestive heart failure.  Suspected new onset heart failure with hypoxia and bilateral lower extremity edema.  Also possible patient may have had a cardiac event weeks prior to her edema starting.  She is Lasix naïve, was provided 40 mg IV, also provided Rocephin with elevated procalcitonin.      Review Of Systems:    All systems were reviewed and negative except as mentioned in history of presenting illness, assessment and plan.    Past Medical History: Patient  has a past  medical history of Anemia, Arthritis, Baker's cyst, Chronic deep vein thrombosis (DVT) of left upper extremity, IBS (irritable colon syndrome), Osteoporosis, Rheumatoid arthritis, and Vitamin D deficiency.    Past Surgical History: Patient  has a past surgical history that includes Shoulder surgery (Right); Breast surgery; Hysterectomy; Breast biopsy; and Vertebroplasty.    Social History: Patient  reports that she has never smoked. She has never used smokeless tobacco. She reports that she does not drink alcohol and does not use drugs.    Family History:  Patient's family history has been reviewed and found to be noncontributory.     Allergies:      Coconut (cocos nucifera), Hctz [hydrochlorothiazide], Penicillins, and Sulfa antibiotics    Home Medications:    Prior to Admission Medications       Prescriptions Last Dose Informant Patient Reported? Taking?    acetaminophen (TYLENOL 8 HOUR) 650 MG 8 hr tablet   No No    Take 1 tablet by mouth Every 8 (Eight) Hours As Needed for Mild Pain .    Adalimumab (HUMIRA PEN SC)   Yes No    Inject 40 BAU/day under the skin into the appropriate area as directed.    celecoxib (CeleBREX) 200 MG capsule   Yes No    Take 1 capsule by mouth Daily.    predniSONE (DELTASONE) 20 MG tablet   No No    Take 1 tablet by mouth 2 (Two) Times a Day.          ED Medications:    Medications   cefTRIAXone (ROCEPHIN) IVPB 1000 mg/50ml dextrose (premix) (1,000 mg Intravenous New Bag 9/7/23 1623)   azithromycin (ZITHROMAX) 500 mg 0.9% NaCl (Add-vantage) 250 mL (has no administration in time range)   cefTRIAXone (ROCEPHIN) IVPB 1000 mg/50ml dextrose (premix) (has no administration in time range)   azithromycin (ZITHROMAX) 500 mg 0.9% NaCl (Add-vantage) 250 mL (has no administration in time range)   furosemide (LASIX) injection 40 mg (has no administration in time range)   furosemide (LASIX) injection 40 mg (40 mg Intravenous Given 9/7/23 1513)     Vital Signs:  Temp:  [96.8 °F (36 °C)-98 °F (36.7  "°C)] 98 °F (36.7 °C)  Heart Rate:  [] 103  Resp:  [17] 17  BP: ()/() 125/109        09/07/23  1035   Weight: 65.8 kg (145 lb)     Body mass index is 26.52 kg/m².    Physical Exam:     Most recent vital Signs: BP (!) 125/109   Pulse 103   Temp 98 °F (36.7 °C) (Axillary)   Resp 17   Ht 157.5 cm (62\")   Wt 65.8 kg (145 lb)   SpO2 97%   BMI 26.52 kg/m²     Physical Exam  Constitutional:       General: She is not in acute distress.     Appearance: She is not ill-appearing.   HENT:      Mouth/Throat:      Mouth: Mucous membranes are moist.   Eyes:      Extraocular Movements: Extraocular movements intact.   Cardiovascular:      Rate and Rhythm: Normal rate and regular rhythm.      Pulses: Normal pulses.      Heart sounds: Normal heart sounds.   Pulmonary:      Effort: Pulmonary effort is normal.      Breath sounds: Normal breath sounds.   Abdominal:      Palpations: Abdomen is soft.      Tenderness: There is no abdominal tenderness.   Musculoskeletal:      Right lower leg: Edema present.      Left lower leg: Edema present.   Neurological:      General: No focal deficit present.      Mental Status: She is alert and oriented to person, place, and time.   Psychiatric:         Mood and Affect: Mood normal.         Thought Content: Thought content normal.       Laboratory data:    I have reviewed the labs done in the emergency room.    Results from last 7 days   Lab Units 09/07/23  1416   SODIUM mmol/L 133*   POTASSIUM mmol/L 5.3*   CHLORIDE mmol/L 98   CO2 mmol/L 11.3*   BUN mg/dL 47*   CREATININE mg/dL 1.42*   CALCIUM mg/dL 10.0   BILIRUBIN mg/dL 2.0*   ALK PHOS U/L 172*   ALT (SGPT) U/L 22   AST (SGOT) U/L 37*   GLUCOSE mg/dL 109*     Results from last 7 days   Lab Units 09/07/23  1111   WBC 10*3/mm3 12.63*   HEMOGLOBIN g/dL 15.9   HEMATOCRIT % 49.2*   PLATELETS 10*3/mm3 52*         Results from last 7 days   Lab Units 09/07/23  1416   HSTROP T ng/L 68*     Results from last 7 days   Lab Units " 09/07/23  1416   PROBNP pg/mL 1,515.0*                       Invalid input(s): USDES,  BLOODU, NITRITITE, BACT, EP    Pain Management Panel           No data to display                EKG:      EKG sinus tachycardia rate 118, no ST elevations or depressions.      Radiology:    XR Chest 1 View    Result Date: 9/7/2023  CLINICAL INDICATION:  SOB, c/f CHF shortness of breath.  EXAMINATION TECHNIQUE: XR CHEST 1 VW-  COMPARISON: Chest radiograph 6/23/2018.  FINDINGS: Stable mild cardiomegaly. Aortic atherosclerosis, tortuosity and ectasia. Bilateral hilar fullness, likely enlarged pulmonary vasculature. No overt edema. Mildly prominent interstitial markings. Small bilateral pleural effusions, left greater than right. Bibasilar atelectasis. No pneumothorax. Right reverse shoulder arthroplasty hardware in place. There is subtle periosteal reaction along the right humeral mid diaphysis surrounding the tip of the humeral stem component, suggestive of stress reaction. Severe left glenohumeral joint degenerative changes.      Mild congestive heart failure.  Images personally reviewed, interpreted and dictated by MARK James.     This report was signed and finalized on 9/7/2023 11:53 AM by MARK James.       Assessment/Plan:    Inpatient general floor admission 9/7/2023 with hypoxia and bilateral lower extremity edema suspected due to CHF.  Also with RENE, may be from volume overload.  Suspected component of bacterial pneumonia with elevated procalcitonin.    At time of admission patient resting comfortably in bed, pleasantly conversational.    Updated daughter Ania via phone. [phone]    Hypoxia  CHF, suspected  Volume overload  Bacterial pneumonia, suspected  RENE  Supplemental oxygen as needed keep saturation above 90%.  Echocardiogram.  Lasix.  Rocephin and azithromycin started 9/7.  Cardiology consultation, recommendations appreciated.  Possible patient may have had a cardiac event preceding  worsening of the symptoms within the past several weeks.    Chronic:  hypertension, rheumatoid arthritis, migraines    Continue other home medications    Risk Assessment: High  DVT Prophylaxis: Heparin  Code Status: DNR/DNI  Diet: Heart healthy, fluid restriction    Advance Care Planning   ACP discussion was held with the patient during this visit. Patient does not have an advance directive, information provided.           Brian Joseph Kerley, DO  09/07/23  16:30 EDT    Dictated utilizing Dragon dictation.

## 2023-09-07 NOTE — ED PROVIDER NOTES
Subjective:  History of Present Illness:    Patient is an 83-year-old female with history of anemia, arthritis, DVT, rheumatoid arthritis who presents today with bilateral pedal edema.  Reports abdominal discomfort, diaphoresis, back pain several weeks ago.  States over the last 2 weeks she has been having increasing pedal edema and now with increasing shortness of breath over the last 4 to 5 days.  Now presents our emergency department for evaluation.  2+ pitting edema bilateral.  Patient tachypneic.  Crackles on exam.  Denies any abdominal pain, history of liver disease.  No past history of CHF.  Denies any fever or chills prior to arrival.      Nurses Notes reviewed and agree, including vitals, allergies, social history and prior medical history.     REVIEW OF SYSTEMS: All systems reviewed and not pertinent unless noted.  Review of Systems   Constitutional:  Positive for activity change and fatigue. Negative for appetite change, chills and fever.   HENT:  Negative for rhinorrhea, sinus pressure and sinus pain.    Eyes:  Negative for discharge and itching.   Respiratory:  Positive for cough and shortness of breath.    Cardiovascular:  Positive for chest pain and leg swelling.   Gastrointestinal:  Negative for abdominal distention, abdominal pain, nausea and vomiting.   Endocrine: Negative for cold intolerance and heat intolerance.   Genitourinary:  Negative for decreased urine volume, difficulty urinating, flank pain, frequency, urgency, vaginal bleeding, vaginal discharge and vaginal pain.   Musculoskeletal:  Negative for gait problem, neck pain and neck stiffness.   Skin:  Negative for color change.   Allergic/Immunologic: Negative for environmental allergies.   Neurological:  Negative for seizures, syncope, facial asymmetry and speech difficulty.   Psychiatric/Behavioral:  Negative for self-injury and suicidal ideas.      Past Medical History:   Diagnosis Date    Anemia     Arthritis     Baker's cyst      "Chronic deep vein thrombosis (DVT) of left upper extremity     IBS (irritable colon syndrome)     Osteoporosis     Rheumatoid arthritis     Vitamin D deficiency        Allergies:    Coconut (cocos nucifera), Hctz [hydrochlorothiazide], Penicillins, and Sulfa antibiotics      Past Surgical History:   Procedure Laterality Date    BREAST BIOPSY      BREAST SURGERY      HYSTERECTOMY      SHOULDER SURGERY Right     VERTEBROPLASTY           Social History     Socioeconomic History    Marital status: Single    Highest education level: 8th grade   Tobacco Use    Smoking status: Never     Passive exposure: Never    Smokeless tobacco: Never   Vaping Use    Vaping Use: Never used   Substance and Sexual Activity    Alcohol use: No    Drug use: No    Sexual activity: Defer         Family History   Problem Relation Age of Onset    Lung cancer Mother     Breast cancer Neg Hx        Objective  Physical Exam:  /87 (BP Location: Right arm, Patient Position: Lying)   Pulse 112   Temp 97.2 °F (36.2 °C) (Axillary)   Resp 18   Ht 157.5 cm (62\")   Wt 57.4 kg (126 lb 8 oz)   SpO2 98%   BMI 23.14 kg/m²      Physical Exam  Constitutional:       General: She is not in acute distress.     Appearance: Normal appearance. She is not ill-appearing.   HENT:      Head: Normocephalic and atraumatic.      Nose: Nose normal. No congestion or rhinorrhea.      Mouth/Throat:      Mouth: Mucous membranes are dry.      Pharynx: Oropharynx is clear. No oropharyngeal exudate or posterior oropharyngeal erythema.   Eyes:      Extraocular Movements: Extraocular movements intact.      Conjunctiva/sclera: Conjunctivae normal.      Pupils: Pupils are equal, round, and reactive to light.   Cardiovascular:      Rate and Rhythm: Regular rhythm. Tachycardia present.      Pulses: Normal pulses.      Heart sounds: Normal heart sounds.   Pulmonary:      Effort: Pulmonary effort is normal. No respiratory distress.      Breath sounds: Normal breath sounds.      " Comments: Tachypneic with crackles in bilateral lobes on exam  Abdominal:      General: Abdomen is flat. Bowel sounds are normal. There is no distension.      Palpations: Abdomen is soft.      Tenderness: There is no abdominal tenderness. There is no guarding or rebound.   Musculoskeletal:         General: No swelling or tenderness. Normal range of motion.      Cervical back: Normal range of motion and neck supple.      Right lower leg: Edema present.      Left lower leg: Edema present.   Skin:     General: Skin is warm and dry.      Capillary Refill: Capillary refill takes less than 2 seconds.   Neurological:      General: No focal deficit present.      Mental Status: She is alert and oriented to person, place, and time. Mental status is at baseline.      Cranial Nerves: No cranial nerve deficit.      Sensory: No sensory deficit.      Motor: No weakness.      Coordination: Coordination normal.   Psychiatric:         Mood and Affect: Mood normal.         Behavior: Behavior normal.         Thought Content: Thought content normal.         Judgment: Judgment normal.       Critical Care  Performed by: Jim Varma MD  Authorized by: Kerley, Brian Joseph, DO     Critical care provider statement:     Critical care time (minutes):  30    Critical care was necessary to treat or prevent imminent or life-threatening deterioration of the following conditions:  Respiratory failure    Critical care was time spent personally by me on the following activities:  Blood draw for specimens, development of treatment plan with patient or surrogate, discussions with primary provider, evaluation of patient's response to treatment, obtaining history from patient or surrogate, ordering and performing treatments and interventions, ordering and review of laboratory studies, ordering and review of radiographic studies, pulse oximetry, review of old charts and re-evaluation of patient's condition    Care discussed with: admitting  provider      ED Course:    ED Course as of 09/07/23 1812   Thu Sep 07, 2023   1106 EKG interpreted by me, sinus tachycardia with no concerning ST changes noted, left axis deviation, rate of 118 [JE]      ED Course User Index  [JE] Jim Varma MD       Lab Results (last 24 hours)       Procedure Component Value Units Date/Time    Blood Culture - Blood, Arm, Right [[id number]] Collected: 09/07/23 0600    Specimen: Blood from Arm, Right Updated: 09/07/23 1615    COVID-19 and FLU A/B PCR - Swab, Nasopharynx [[id number]]  (Normal) Collected: 09/07/23 1110    Specimen: Swab from Nasopharynx Updated: 09/07/23 1153     COVID19 Not Detected     Influenza A PCR Not Detected     Influenza B PCR Not Detected    Narrative:      Fact sheet for providers: https://www.fda.gov/media/986288/download    Fact sheet for patients: https://www.fda.gov/media/725193/download    Test performed by PCR.    CBC & Differential [[id number]]  (Abnormal) Collected: 09/07/23 1111    Specimen: Blood Updated: 09/07/23 1142    Narrative:      The following orders were created for panel order CBC & Differential.  Procedure                               Abnormality         Status                     ---------                               -----------         ------                     CBC Auto Differential[[id number]]        Abnormal            Final result               Scan Slide[[id number]]                                       Final result                 Please view results for these tests on the individual orders.    CBC Auto Differential [[id number]]  (Abnormal) Collected: 09/07/23 1111    Specimen: Blood Updated: 09/07/23 1142     WBC 12.63 10*3/mm3      RBC 5.83 10*6/mm3      Hemoglobin 15.9 g/dL      Hematocrit 49.2 %      MCV 84.4 fL      MCH 27.3 pg      MCHC 32.3 g/dL      RDW 17.2 %      RDW-SD 50.6 fl      MPV 10.9 fL      Platelets 52 10*3/mm3      Neutrophil % 84.3 %      Lymphocyte % 5.8 %      Monocyte % 9.0 %      Eosinophil % 0.1  %      Basophil % 0.2 %      Immature Grans % 0.6 %      Neutrophils, Absolute 10.65 10*3/mm3      Lymphocytes, Absolute 0.73 10*3/mm3      Monocytes, Absolute 1.14 10*3/mm3      Eosinophils, Absolute 0.01 10*3/mm3      Basophils, Absolute 0.02 10*3/mm3      Immature Grans, Absolute 0.08 10*3/mm3      nRBC 0.0 /100 WBC     Scan Slide [[id number]] Collected: 09/07/23 1111    Specimen: Blood Updated: 09/07/23 1142     RBC Morphology Normal     WBC Morphology Normal     Platelet Estimate Decreased    Comprehensive Metabolic Panel [[id number]]  (Abnormal) Collected: 09/07/23 1416    Specimen: Blood Updated: 09/07/23 1458     Glucose 109 mg/dL      BUN 47 mg/dL      Creatinine 1.42 mg/dL      Sodium 133 mmol/L      Potassium 5.3 mmol/L      Comment: Specimen hemolyzed.  Results may be affected.        Chloride 98 mmol/L      CO2 11.3 mmol/L      Calcium 10.0 mg/dL      Total Protein 7.2 g/dL      Albumin 3.9 g/dL      ALT (SGPT) 22 U/L      Comment: Specimen hemolyzed.  Results may be affected.        AST (SGOT) 37 U/L      Comment: Specimen hemolyzed.  Results may be affected.        Alkaline Phosphatase 172 U/L      Total Bilirubin 2.0 mg/dL      Globulin 3.3 gm/dL      A/G Ratio 1.2 g/dL      BUN/Creatinine Ratio 33.1     Anion Gap 23.7 mmol/L      eGFR 39.4 mL/min/1.73     Narrative:      GFR Normal >60  Chronic Kidney Disease <60  Kidney Failure <15    The GFR formula is only valid for adults with stable renal function between ages 18 and 70.    BNP [[id number]]  (Abnormal) Collected: 09/07/23 1416    Specimen: Blood Updated: 09/07/23 1502     proBNP 1,515.0 pg/mL     Narrative:      Among patients with dyspnea, NT-proBNP is highly sensitive for the detection of acute congestive heart failure. In addition NT-proBNP of <300 pg/ml effectively rules out acute congestive heart failure with 99% negative predictive value.      High Sensitivity Troponin T [[id number]]  (Abnormal) Collected: 09/07/23 1416    Specimen: Blood  "Updated: 09/07/23 1513     HS Troponin T 68 ng/L      Comment: Specimen hemolyzed.  Results may be affected.       Narrative:      High Sensitive Troponin T Reference Range:  <10.0 ng/L- Negative Female for AMI  <15.0 ng/L- Negative Male for AMI  >=10 - Abnormal Female indicating possible myocardial injury.  >=15 - Abnormal Male indicating possible myocardial injury.   Clinicians would have to utilize clinical acumen, EKG, Troponin, and serial changes to determine if it is an Acute Myocardial Infarction or myocardial injury due to an underlying chronic condition.         C-reactive Protein [[id number]]  (Abnormal) Collected: 09/07/23 1416    Specimen: Blood Updated: 09/07/23 1455     C-Reactive Protein 8.19 mg/dL     Procalcitonin [[id number]]  (Abnormal) Collected: 09/07/23 1416    Specimen: Blood Updated: 09/07/23 1505     Procalcitonin 0.70 ng/mL     Narrative:      As a Marker for Sepsis (Non-Neonates):    1. <0.5 ng/mL represents a low risk of severe sepsis and/or septic shock.  2. >2 ng/mL represents a high risk of severe sepsis and/or septic shock.    As a Marker for Lower Respiratory Tract Infections that require antibiotic therapy:    PCT on Admission    Antibiotic Therapy       6-12 Hrs later    >0.5                Strongly Recommended  >0.25 - <0.5        Recommended   0.1 - 0.25          Discouraged              Remeasure/reassess PCT  <0.1                Strongly Discouraged     Remeasure/reassess PCT    As 28 day mortality risk marker: \"Change in Procalcitonin Result\" (>80% or <=80%) if Day 0 (or Day 1) and Day 4 values are available. Refer to http://www.[employer]-pct-calculator.com    Change in PCT <=80%  A decrease of PCT levels below or equal to 80% defines a positive change in PCT test result representing a higher risk for 28-day all-cause mortality of patients diagnosed with severe sepsis for septic shock.    Change in PCT >80%  A decrease of PCT levels of more than 80% defines a negative change in PCT " result representing a lower risk for 28-day all-cause mortality of patients diagnosed with severe sepsis or septic shock.       Magnesium [[id number]]  (Normal) Collected: 09/07/23 1416    Specimen: Blood Updated: 09/07/23 1458     Magnesium 2.1 mg/dL     High Sensitivity Troponin T 2Hr [[id number]] Collected: 09/07/23 1740    Specimen: Blood Updated: 09/07/23 1743             XR Chest 1 View    Result Date: 9/7/2023  CLINICAL INDICATION:  SOB, c/f CHF shortness of breath.  EXAMINATION TECHNIQUE: XR CHEST 1 VW-  COMPARISON: Chest radiograph 6/23/2018.  FINDINGS: Stable mild cardiomegaly. Aortic atherosclerosis, tortuosity and ectasia. Bilateral hilar fullness, likely enlarged pulmonary vasculature. No overt edema. Mildly prominent interstitial markings. Small bilateral pleural effusions, left greater than right. Bibasilar atelectasis. No pneumothorax. Right reverse shoulder arthroplasty hardware in place. There is subtle periosteal reaction along the right humeral mid diaphysis surrounding the tip of the humeral stem component, suggestive of stress reaction. Severe left glenohumeral joint degenerative changes.      Impression: Mild congestive heart failure.  Images personally reviewed, interpreted and dictated by MARK James.     This report was signed and finalized on 9/7/2023 11:53 AM by MARK James.          MDM     Amount and/or Complexity of Data Reviewed  Decide to obtain previous medical records or to obtain history from someone other than the patient: yes        Initial impression of presenting illness: Pedal edema    DDX: includes but is not limited to: New onset heart failure, anasarca, DVT    Patient arrives stable with vitals interpreted by myself.     Pertinent features from physical exam: No hypoxia but tachypneic, crackles in bilateral lobes, 2+ pitting edema bilateral.    Initial diagnostic plan: CBC, CMP, troponin, ECG, chest x-ray, BNP    Results from initial plan were reviewed  and interpreted by me revealing concern for fluid overload given BNP, pulmonary edema seen on chest x-ray, new oxygen requirement and troponin likely type II secondary to demand from shortness of breath    Diagnostic information from other sources: Reviewed past medical records    Interventions / Re-evaluation: Given concerns for fluid overload given diuretics, given high procalcitonin after discussion with hospitalist service, per their request, given antibiotics    Results/clinical rationale were discussed with patient at bedside    Consultations/Discussion of results with other physicians: Given my concern for respiratory failure secondary to fluid overload, concern for new onset heart failure, discussed with hospitalist admitted to their service further management    Disposition plan: Admit  -----        Final diagnoses:   Hypervolemia, unspecified hypervolemia type          Jim Varma MD  09/07/23 9562

## 2023-09-08 ENCOUNTER — APPOINTMENT (OUTPATIENT)
Dept: ULTRASOUND IMAGING | Facility: HOSPITAL | Age: 73
DRG: 291 | End: 2023-09-08
Payer: MEDICARE

## 2023-09-08 ENCOUNTER — ANESTHESIA EVENT (OUTPATIENT)
Dept: TELEMETRY | Facility: HOSPITAL | Age: 73
DRG: 291 | End: 2023-09-08
Payer: MEDICARE

## 2023-09-08 ENCOUNTER — ANESTHESIA (OUTPATIENT)
Dept: TELEMETRY | Facility: HOSPITAL | Age: 73
DRG: 291 | End: 2023-09-08
Payer: MEDICARE

## 2023-09-08 ENCOUNTER — APPOINTMENT (OUTPATIENT)
Dept: CT IMAGING | Facility: HOSPITAL | Age: 73
DRG: 291 | End: 2023-09-08
Payer: MEDICARE

## 2023-09-08 VITALS
WEIGHT: 128.31 LBS | SYSTOLIC BLOOD PRESSURE: 119 MMHG | HEIGHT: 62 IN | TEMPERATURE: 97.4 F | DIASTOLIC BLOOD PRESSURE: 81 MMHG | HEART RATE: 113 BPM | RESPIRATION RATE: 18 BRPM | OXYGEN SATURATION: 100 % | BODY MASS INDEX: 23.61 KG/M2

## 2023-09-08 LAB
ANION GAP SERPL CALCULATED.3IONS-SCNC: 24.7 MMOL/L (ref 5–15)
BASOPHILS # BLD AUTO: 0.01 10*3/MM3 (ref 0–0.2)
BASOPHILS NFR BLD AUTO: 0.1 % (ref 0–1.5)
BH CV ECHO MEAS - AO MAX PG: 3.4 MMHG
BH CV ECHO MEAS - AO MEAN PG: 2 MMHG
BH CV ECHO MEAS - AO ROOT DIAM: 2.7 CM
BH CV ECHO MEAS - AO V2 MAX: 91.8 CM/SEC
BH CV ECHO MEAS - AO V2 VTI: 11.5 CM
BH CV ECHO MEAS - AVA(I,D): 2.3 CM2
BH CV ECHO MEAS - EDV(CUBED): 11.7 ML
BH CV ECHO MEAS - EDV(MOD-SP2): 16.9 ML
BH CV ECHO MEAS - EDV(MOD-SP4): 15.3 ML
BH CV ECHO MEAS - EF(MOD-BP): 65.2 %
BH CV ECHO MEAS - EF(MOD-SP2): 63.7 %
BH CV ECHO MEAS - EF(MOD-SP4): 66.6 %
BH CV ECHO MEAS - ESV(CUBED): 3.1 ML
BH CV ECHO MEAS - ESV(MOD-SP2): 6.1 ML
BH CV ECHO MEAS - ESV(MOD-SP4): 5.1 ML
BH CV ECHO MEAS - FS: 35.7 %
BH CV ECHO MEAS - IVS/LVPW: 0.74 CM
BH CV ECHO MEAS - IVSD: 1.29 CM
BH CV ECHO MEAS - LA DIMENSION: 1.81 CM
BH CV ECHO MEAS - LAT PEAK E' VEL: 8.5 CM/SEC
BH CV ECHO MEAS - LV MASS(C)D: 115.4 GRAMS
BH CV ECHO MEAS - LV MAX PG: 2.8 MMHG
BH CV ECHO MEAS - LV MEAN PG: 1 MMHG
BH CV ECHO MEAS - LV V1 MAX: 83.9 CM/SEC
BH CV ECHO MEAS - LV V1 VTI: 9.6 CM
BH CV ECHO MEAS - LVIDD: 2.27 CM
BH CV ECHO MEAS - LVIDS: 1.46 CM
BH CV ECHO MEAS - LVOT AREA: 2.7 CM2
BH CV ECHO MEAS - LVOT DIAM: 1.87 CM
BH CV ECHO MEAS - LVPWD: 1.75 CM
BH CV ECHO MEAS - MED PEAK E' VEL: 9.5 CM/SEC
BH CV ECHO MEAS - MV DEC TIME: 0.07 MSEC
BH CV ECHO MEAS - MV E MAX VEL: 83.9 CM/SEC
BH CV ECHO MEAS - MV MAX PG: 4.2 MMHG
BH CV ECHO MEAS - MV MEAN PG: 2 MMHG
BH CV ECHO MEAS - MV V2 VTI: 17.9 CM
BH CV ECHO MEAS - MVA(VTI): 1.48 CM2
BH CV ECHO MEAS - PA ACC TIME: 0.1 SEC
BH CV ECHO MEAS - PA V2 MAX: 105 CM/SEC
BH CV ECHO MEAS - RV MAX PG: 1.95 MMHG
BH CV ECHO MEAS - RV V1 MAX: 69.9 CM/SEC
BH CV ECHO MEAS - RV V1 VTI: 8.6 CM
BH CV ECHO MEAS - SV(LVOT): 26.5 ML
BH CV ECHO MEAS - SV(MOD-SP2): 10.8 ML
BH CV ECHO MEAS - SV(MOD-SP4): 10.2 ML
BH CV ECHO MEASUREMENTS AVERAGE E/E' RATIO: 9.32
BUN SERPL-MCNC: 51 MG/DL (ref 8–23)
BUN/CREAT SERPL: 37 (ref 7–25)
CALCIUM SPEC-SCNC: 9.9 MG/DL (ref 8.6–10.5)
CHLORIDE SERPL-SCNC: 96 MMOL/L (ref 98–107)
CK SERPL-CCNC: 75 U/L (ref 20–180)
CO2 SERPL-SCNC: 12.3 MMOL/L (ref 22–29)
CREAT SERPL-MCNC: 1.38 MG/DL (ref 0.57–1)
D-LACTATE SERPL-SCNC: 3 MMOL/L (ref 0.5–2)
DEPRECATED RDW RBC AUTO: 47.6 FL (ref 37–54)
EGFRCR SERPLBLD CKD-EPI 2021: 40.8 ML/MIN/1.73
EOSINOPHIL # BLD AUTO: 0.01 10*3/MM3 (ref 0–0.4)
EOSINOPHIL NFR BLD AUTO: 0.1 % (ref 0.3–6.2)
ERYTHROCYTE [DISTWIDTH] IN BLOOD BY AUTOMATED COUNT: 17 % (ref 12.3–15.4)
GLUCOSE SERPL-MCNC: 100 MG/DL (ref 65–99)
HCT VFR BLD AUTO: 45.7 % (ref 34–46.6)
HGB BLD-MCNC: 15.3 G/DL (ref 12–15.9)
IMM GRANULOCYTES # BLD AUTO: 0.07 10*3/MM3 (ref 0–0.05)
IMM GRANULOCYTES NFR BLD AUTO: 0.6 % (ref 0–0.5)
LEFT ATRIUM VOLUME INDEX: 9.7 ML/M2
LYMPHOCYTES # BLD AUTO: 1.72 10*3/MM3 (ref 0.7–3.1)
LYMPHOCYTES NFR BLD AUTO: 13.7 % (ref 19.6–45.3)
MCH RBC QN AUTO: 27.6 PG (ref 26.6–33)
MCHC RBC AUTO-ENTMCNC: 33.5 G/DL (ref 31.5–35.7)
MCV RBC AUTO: 82.3 FL (ref 79–97)
MONOCYTES # BLD AUTO: 1.42 10*3/MM3 (ref 0.1–0.9)
MONOCYTES NFR BLD AUTO: 11.3 % (ref 5–12)
NEUTROPHILS NFR BLD AUTO: 74.2 % (ref 42.7–76)
NEUTROPHILS NFR BLD AUTO: 9.35 10*3/MM3 (ref 1.7–7)
NRBC BLD AUTO-RTO: 0 /100 WBC (ref 0–0.2)
PHOSPHATE SERPL-MCNC: 4.4 MG/DL (ref 2.5–4.5)
PLATELET # BLD AUTO: ABNORMAL 10*3/UL
PMV BLD AUTO: 11.9 FL (ref 6–12)
POTASSIUM SERPL-SCNC: 5.4 MMOL/L (ref 3.5–5.2)
PROCALCITONIN SERPL-MCNC: 0.63 NG/ML (ref 0–0.25)
RBC # BLD AUTO: 5.55 10*6/MM3 (ref 3.77–5.28)
RBC MORPH BLD: NORMAL
SMALL PLATELETS BLD QL SMEAR: NORMAL
SODIUM SERPL-SCNC: 133 MMOL/L (ref 136–145)
URATE SERPL-MCNC: 11.7 MG/DL (ref 2.4–5.7)
WBC MORPH BLD: NORMAL
WBC NRBC COR # BLD: 12.58 10*3/MM3 (ref 3.4–10.8)

## 2023-09-08 PROCEDURE — 84550 ASSAY OF BLOOD/URIC ACID: CPT | Performed by: INTERNAL MEDICINE

## 2023-09-08 PROCEDURE — 82550 ASSAY OF CK (CPK): CPT | Performed by: INTERNAL MEDICINE

## 2023-09-08 PROCEDURE — 87641 MR-STAPH DNA AMP PROBE: CPT | Performed by: INTERNAL MEDICINE

## 2023-09-08 PROCEDURE — 84145 PROCALCITONIN (PCT): CPT | Performed by: STUDENT IN AN ORGANIZED HEALTH CARE EDUCATION/TRAINING PROGRAM

## 2023-09-08 PROCEDURE — 63710000001 ONDANSETRON PER 8 MG: Performed by: INTERNAL MEDICINE

## 2023-09-08 PROCEDURE — 99222 1ST HOSP IP/OBS MODERATE 55: CPT | Performed by: INTERNAL MEDICINE

## 2023-09-08 PROCEDURE — 83605 ASSAY OF LACTIC ACID: CPT | Performed by: INTERNAL MEDICINE

## 2023-09-08 PROCEDURE — 63710000001 INSULIN REGULAR HUMAN PER 5 UNITS: Performed by: STUDENT IN AN ORGANIZED HEALTH CARE EDUCATION/TRAINING PROGRAM

## 2023-09-08 PROCEDURE — 25010000002 CEFTRIAXONE SODIUM-DEXTROSE 1-3.74 GM-%(50ML) RECONSTITUTED SOLUTION: Performed by: STUDENT IN AN ORGANIZED HEALTH CARE EDUCATION/TRAINING PROGRAM

## 2023-09-08 PROCEDURE — 80048 BASIC METABOLIC PNL TOTAL CA: CPT | Performed by: STUDENT IN AN ORGANIZED HEALTH CARE EDUCATION/TRAINING PROGRAM

## 2023-09-08 PROCEDURE — 93971 EXTREMITY STUDY: CPT

## 2023-09-08 PROCEDURE — 84100 ASSAY OF PHOSPHORUS: CPT | Performed by: INTERNAL MEDICINE

## 2023-09-08 PROCEDURE — 85025 COMPLETE CBC W/AUTO DIFF WBC: CPT | Performed by: STUDENT IN AN ORGANIZED HEALTH CARE EDUCATION/TRAINING PROGRAM

## 2023-09-08 PROCEDURE — 76775 US EXAM ABDO BACK WALL LIM: CPT

## 2023-09-08 PROCEDURE — 25010000002 AZITHROMYCIN PER 500 MG: Performed by: STUDENT IN AN ORGANIZED HEALTH CARE EDUCATION/TRAINING PROGRAM

## 2023-09-08 PROCEDURE — 73200 CT UPPER EXTREMITY W/O DYE: CPT

## 2023-09-08 PROCEDURE — 97166 OT EVAL MOD COMPLEX 45 MIN: CPT

## 2023-09-08 PROCEDURE — 85007 BL SMEAR W/DIFF WBC COUNT: CPT | Performed by: STUDENT IN AN ORGANIZED HEALTH CARE EDUCATION/TRAINING PROGRAM

## 2023-09-08 PROCEDURE — 92610 EVALUATE SWALLOWING FUNCTION: CPT

## 2023-09-08 PROCEDURE — 97162 PT EVAL MOD COMPLEX 30 MIN: CPT

## 2023-09-08 PROCEDURE — 87070 CULTURE OTHR SPECIMN AEROBIC: CPT | Performed by: INTERNAL MEDICINE

## 2023-09-08 PROCEDURE — 99239 HOSP IP/OBS DSCHRG MGMT >30: CPT | Performed by: INTERNAL MEDICINE

## 2023-09-08 PROCEDURE — 87205 SMEAR GRAM STAIN: CPT | Performed by: INTERNAL MEDICINE

## 2023-09-08 PROCEDURE — 25010000002 HEPARIN (PORCINE) PER 1000 UNITS: Performed by: STUDENT IN AN ORGANIZED HEALTH CARE EDUCATION/TRAINING PROGRAM

## 2023-09-08 RX ORDER — POTASSIUM CHLORIDE 20 MEQ/1
20 TABLET, EXTENDED RELEASE ORAL DAILY
COMMUNITY

## 2023-09-08 RX ORDER — ACETAMINOPHEN AND CODEINE PHOSPHATE 300; 30 MG/1; MG/1
1 TABLET ORAL 3 TIMES DAILY
COMMUNITY

## 2023-09-08 RX ORDER — SODIUM BICARBONATE 650 MG/1
1300 TABLET ORAL 3 TIMES DAILY
Status: DISCONTINUED | OUTPATIENT
Start: 2023-09-08 | End: 2023-09-09 | Stop reason: HOSPADM

## 2023-09-08 RX ORDER — ENOXAPARIN SODIUM 100 MG/ML
1 INJECTION SUBCUTANEOUS EVERY 12 HOURS
Status: DISCONTINUED | OUTPATIENT
Start: 2023-09-08 | End: 2023-09-08

## 2023-09-08 RX ORDER — DEXTROSE MONOHYDRATE 25 G/50ML
25 INJECTION, SOLUTION INTRAVENOUS ONCE
Status: COMPLETED | OUTPATIENT
Start: 2023-09-08 | End: 2023-09-08

## 2023-09-08 RX ORDER — CLINDAMYCIN PHOSPHATE 150 MG/ML
600 INJECTION, SOLUTION INTRAVENOUS EVERY 8 HOURS SCHEDULED
Status: DISCONTINUED | OUTPATIENT
Start: 2023-09-08 | End: 2023-09-09

## 2023-09-08 RX ORDER — CYCLOBENZAPRINE HCL 10 MG
10 TABLET ORAL NIGHTLY
COMMUNITY

## 2023-09-08 RX ORDER — SODIUM BICARBONATE 650 MG/1
1300 TABLET ORAL 3 TIMES DAILY
Start: 2023-09-08

## 2023-09-08 RX ORDER — CEFEPIME HYDROCHLORIDE 2 G/50ML
2000 INJECTION, SOLUTION INTRAVENOUS ONCE
Status: DISCONTINUED | OUTPATIENT
Start: 2023-09-09 | End: 2023-09-09 | Stop reason: HOSPADM

## 2023-09-08 RX ORDER — CEFTRIAXONE 1 G/50ML
1000 INJECTION, SOLUTION INTRAVENOUS EVERY 24 HOURS
Qty: 150 ML | Refills: 0
Start: 2023-09-09 | End: 2023-09-09 | Stop reason: HOSPADM

## 2023-09-08 RX ORDER — BUMETANIDE 0.25 MG/ML
4 INJECTION INTRAMUSCULAR; INTRAVENOUS EVERY 6 HOURS
Status: DISPENSED | OUTPATIENT
Start: 2023-09-08 | End: 2023-09-09

## 2023-09-08 RX ORDER — ONDANSETRON 4 MG/1
4 TABLET, FILM COATED ORAL EVERY 6 HOURS PRN
Status: DISCONTINUED | OUTPATIENT
Start: 2023-09-08 | End: 2023-09-09 | Stop reason: HOSPADM

## 2023-09-08 RX ORDER — CEFEPIME HYDROCHLORIDE 2 G/50ML
2000 INJECTION, SOLUTION INTRAVENOUS EVERY 8 HOURS
Status: DISCONTINUED | OUTPATIENT
Start: 2023-09-09 | End: 2023-09-09 | Stop reason: HOSPADM

## 2023-09-08 RX ORDER — FUROSEMIDE 20 MG/1
20 TABLET ORAL DAILY PRN
COMMUNITY

## 2023-09-08 RX ADMIN — BUMETANIDE 4 MG: 0.25 INJECTION, SOLUTION INTRAMUSCULAR; INTRAVENOUS at 12:00

## 2023-09-08 RX ADMIN — CEFTRIAXONE 1000 MG: 1 INJECTION, SOLUTION INTRAVENOUS at 14:46

## 2023-09-08 RX ADMIN — ACETAMINOPHEN 650 MG: 325 TABLET, FILM COATED ORAL at 18:04

## 2023-09-08 RX ADMIN — DEXTROSE MONOHYDRATE 25 ML: 25 INJECTION, SOLUTION INTRAVENOUS at 11:35

## 2023-09-08 RX ADMIN — SODIUM BICARBONATE 650 MG TABLET 1300 MG: at 22:49

## 2023-09-08 RX ADMIN — SODIUM BICARBONATE 650 MG TABLET 1300 MG: at 15:56

## 2023-09-08 RX ADMIN — HUMAN INSULIN 10 UNITS: 100 INJECTION, SOLUTION SUBCUTANEOUS at 11:34

## 2023-09-08 RX ADMIN — ACETAMINOPHEN 650 MG: 325 TABLET, FILM COATED ORAL at 12:01

## 2023-09-08 RX ADMIN — SENNOSIDES AND DOCUSATE SODIUM 2 TABLET: 50; 8.6 TABLET ORAL at 09:13

## 2023-09-08 RX ADMIN — HEPARIN SODIUM 5000 UNITS: 5000 INJECTION INTRAVENOUS; SUBCUTANEOUS at 09:13

## 2023-09-08 RX ADMIN — SODIUM CHLORIDE 500 MG: 900 INJECTION, SOLUTION INTRAVENOUS at 15:23

## 2023-09-08 RX ADMIN — SODIUM ZIRCONIUM CYCLOSILICATE 10 G: 10 POWDER, FOR SUSPENSION ORAL at 11:37

## 2023-09-08 RX ADMIN — ONDANSETRON HYDROCHLORIDE 4 MG: 4 TABLET, FILM COATED ORAL at 23:34

## 2023-09-08 NOTE — THERAPY EVALUATION
Patient Name: Aron Jacobsen  : 1950    MRN: [id number]                              Today's Date: 2023       Admit Date: 2023    Visit Dx:     ICD-10-CM ICD-9-CM   1. Hypervolemia, unspecified hypervolemia type  E87.70 276.69     Patient Active Problem List   Diagnosis    History of total left hip arthroplasty    Closed fracture of head of left femur    Fluid overload     Past Medical History:   Diagnosis Date    Anemia     Arthritis     Baker's cyst     Chronic deep vein thrombosis (DVT) of left upper extremity     IBS (irritable colon syndrome)     Impaired mobility     Osteoporosis     Rheumatoid arthritis     Vitamin D deficiency      Past Surgical History:   Procedure Laterality Date    BREAST BIOPSY      BREAST SURGERY      HYSTERECTOMY      SHOULDER SURGERY Right     VERTEBROPLASTY        General Information       Row Name 23 1252          OT Time and Intention    Document Type evaluation  -DB     Mode of Treatment occupational therapy  -DB       Row Name 23 1252          General Information    Patient Profile Reviewed yes  -DB     Prior Level of Function independent:;ADL's  -DB     Existing Precautions/Restrictions fall  -DB     Barriers to Rehab medically complex;previous functional deficit  -DB       Row Name 23 1252          Occupational Profile    Reason for Services/Referral (Occupational Profile) ADL decline  -DB       Row Name 23 1252          Living Environment    People in Home alone  -DB       Row Name 23 1252          Home Main Entrance    Number of Stairs, Main Entrance none  -DB       Row Name 23 1252          Cognition    Orientation Status (Cognition) oriented x 4  -DB       Row Name 23 1252          Safety Issues, Functional Mobility    Impairments Affecting Function (Mobility) balance;endurance/activity tolerance;pain;shortness of breath;range of motion (ROM);strength;muscle tone abnormal  -DB               User Key  (r) =  Recorded By, (t) = Taken By, (c) = Cosigned By      Initials Name Provider Type    DB Carley Guthrie OT Occupational Therapist                     Mobility/ADL's       Row Name 09/08/23 1254          Bed Mobility    Bed Mobility supine-sit  -DB     All Activities, [place] (Bed Mobility) maximum assist (25% patient effort);2 person assist  -DB     Supine-Sit [place] (Bed Mobility) maximum assist (25% patient effort);2 person assist  pt min A to maintain sitting balance x5mins EOB  -DB       Row Name 09/08/23 1254          Functional Mobility    Patient was able to Ambulate no, other medical factors prevent ambulation  -DB     Reason Patient was unable to Ambulate Excessive Weakness  -DB       Row Name 09/08/23 1254          Activities of Daily Living    BADL Assessment/Intervention bathing;upper body dressing;lower body dressing;grooming;feeding;toileting  -DB       Row Name 09/08/23 1254          Bathing Assessment/Intervention    [place] Level (Bathing) bathing skills;maximum assist (25% patient effort)  -DB       Row Name 09/08/23 1254          Upper Body Dressing Assessment/Training    [place] Level (Upper Body Dressing) upper body dressing skills;maximum assist (25% patient effort)  -DB       Row Name 09/08/23 1254          Lower Body Dressing Assessment/Training    [place] Level (Lower Body Dressing) lower body dressing skills;maximum assist (25% patient effort)  -DB       Row Name 09/08/23 1254          Grooming Assessment/Training    [place] Level (Grooming) grooming skills;maximum assist (25% patient effort)  -DB       Row Name 09/08/23 1254          Self-Feeding Assessment/Training    [place] Level (Feeding) feeding skills;set up  -DB       Row Name 09/08/23 1254          Toileting Assessment/Training    [place] Level (Toileting) toileting skills;maximum assist (25% patient effort)  -DB               User Key  (r) = Recorded By, (t) = Taken By, (c) = Cosigned  By      Initials Name Provider Type    DB Carley Guthrie OT Occupational Therapist                   Obj/Interventions       Row Name 09/08/23 1259          Sensory Assessment (Somatosensory)    Sensory Assessment (Somatosensory) sensation intact  -DB       Row Name 09/08/23 1259          Range of Motion Comprehensive    General Range of Motion upper extremity range of motion deficits identified  -DB       Row Name 09/08/23 1259          Strength Comprehensive (MMT)    General Manual Muscle Testing (MMT) Assessment upper extremity strength deficits identified  -DB     Comment, General Manual Muscle Testing (MMT) Assessment BUE 2+/5  -DB       Row Name 09/08/23 1259          Upper Extremity (Manual Muscle Testing)    Upper Extremity: Manual Muscle Testing (MMT) right shoulder strength deficit;left shoulder strength deficit  -DB       Row Name 09/08/23 1259          Balance    Comment, Balance Pt able to sit EOB x5mins with min A  -DB       Row Name 09/08/23 1259          General Upper Extremity Assessment (Range of Motion)    Upper Extremity: Range of Motion shoulder, left: UE ROM;shoulder, right: UE ROM  -DB       Row Name 09/08/23 1259          Right Shoulder (Manual Muscle Testing)    Comment, MMT: Right Shoulder 2+/5  -DB       Row Name 09/08/23 1259          Left Shoulder (Manual Muscle Testing)    Comment, MMT: Left Shoulder 2+/5  -DB               User Key  (r) = Recorded By, (t) = Taken By, (c) = Cosigned By      Initials Name Provider Type    DB Carley Guthrie OT Occupational Therapist                   Goals/Plan       Row Name 09/08/23 1308          Bed Mobility Goal 1 (OT)    Activity/Assistive Device (Bed Mobility Goal 1, OT) bed mobility activities, all  -DB     [place] Level/Cues Needed (Bed Mobility Goal 1, OT) minimum assist (75% or more patient effort)  -DB     Time Frame (Bed Mobility Goal 1, OT) by discharge  -DB     Progress/Outcomes (Bed Mobility Goal 1, OT) goal ongoing   -DB       Row Name 09/08/23 1308          Bathing Goal 1 (OT)    Activity/Device (Bathing Goal 1, OT) bathing skills, all  -DB     [place] Level/Cues Needed (Bathing Goal 1, OT) set-up required  -DB     Time Frame (Bathing Goal 1, OT) long term goal (LTG)  -DB     Progress/Outcomes (Bathing Goal 1, OT) goal ongoing  -DB       Row Name 09/08/23 1308          Dressing Goal 1 (OT)    Activity/Device (Dressing Goal 1, OT) lower body dressing  -DB     [place]/Cues Needed (Dressing Goal 1, OT) minimum assist (75% or more patient effort)  -DB     Time Frame (Dressing Goal 1, OT) by discharge  -DB     Progress/Outcome (Dressing Goal 1, OT) goal ongoing  -DB       Row Name 09/08/23 1308          Toileting Goal 1 (OT)    Activity/Device (Toileting Goal 1, OT) adjust/manage clothing;perform perineal hygiene  -DB     [place] Level/Cues Needed (Toileting Goal 1, OT) minimum assist (75% or more patient effort)  -DB     Time Frame (Toileting Goal 1, OT) by discharge  -DB     Progress/Outcome (Toileting Goal 1, OT) goal ongoing  -DB       Row Name 09/08/23 1308          Strength Goal 1 (OT)    Strength Goal 1 (OT) Pt to tolerate resistance bands exercises to improve strength and endurance needed for ADL mgmt  -DB     Time Frame (Strength Goal 1, OT) by discharge  -DB     Progress/Outcome (Strength Goal 1, OT) goal ongoing  -DB               User Key  (r) = Recorded By, (t) = Taken By, (c) = Cosigned By      Initials Name Provider Type    DB Carley Guthrie, OT Occupational Therapist                   Clinical Impression       Row Name 09/08/23 1301          Pain Assessment    Pretreatment Pain Rating 7/10  -DB     Posttreatment Pain Rating 7/10  -DB     Pain Location - chest  -DB     Pre/Posttreatment Pain Comment Pt reports chest pain is ongoing  -DB     Pain Intervention(s) Repositioned  -DB       Row Name 09/08/23 1301          Plan of Care Review    Plan of Care Reviewed With patient  -DB     Progress no  change  -DB     Outcome Evaluation OT sarath completed this date. Pt states she lives alone with assist from daughter occasionally. She states she lives in a 1BR apartment, with rollator walker, shower bench and was (I) with ADLs. Pt able to complete supine to sit EOB with max A x2. Pt min A to maintain upright sitting balance. Kyphosis noted with decreased muscle tone. Pt with increased weakness and was unable to stand. Pt max A for dressing and toileting tasks. Pt to benefit from skilled OT services to address strength, endurance, ADL mgmt, transfers and pain.  -DB       Row Name 09/08/23 1301          Therapy Assessment/Plan (OT)    Rehab Potential (OT) good, to achieve stated therapy goals  -DB     Criteria for Skilled Therapeutic Interventions Met (OT) yes;skilled treatment is necessary  -DB     Therapy Frequency (OT) 3 times/wk  5x's per week  if indicated-(M-F)  -DB       Row Name 09/08/23 1301          Therapy Plan Review/Discharge Plan (OT)    Anticipated Discharge Disposition (OT) --  STR  -DB       Row Name 09/08/23 1301          Vital Signs    Pre SpO2 (%) 92  -DB     O2 Delivery Pre Treatment supplemental O2  -DB     Post SpO2 (%) 94  -DB     O2 Delivery Post Treatment supplemental O2  -DB     Pre Patient Position Supine  -DB     Intra Patient Position Sitting  -DB     Post Patient Position Supine  -DB       Row Name 09/08/23 1301          Positioning and Restraints    Pre-Treatment Position in bed  -DB     Post Treatment Position bed  -DB     In Bed notified nsg;exit alarm on;fowlers;call light within reach  -DB               User Key  (r) = Recorded By, (t) = Taken By, (c) = Cosigned By      Initials Name Provider Type    Carley Reyes, OT Occupational Therapist                   Outcome Measures       Row Name 09/08/23 1310          How much help from another is currently needed...    Putting on and taking off regular lower body clothing? 2  -DB     Bathing (including washing, rinsing, and  drying) 2  -DB     Toileting (which includes using toilet bed pan or urinal) 2  -DB     Putting on and taking off regular upper body clothing 2  -DB     Taking care of personal grooming (such as brushing teeth) 2  -DB     Eating meals 3  -DB     AM-PAC 6 Clicks Score (OT) 13  -DB       Row Name 09/08/23 1207          How much help from another person do you currently need...    Turning from your back to your side while in flat bed without using bedrails? 2  -MS     Moving from lying on back to sitting on the side of a flat bed without bedrails? 2  -MS     Moving to and from a bed to a chair (including a wheelchair)? 1  -MS     Standing up from a chair using your arms (e.g., wheelchair, bedside chair)? 1  -MS     Climbing 3-5 steps with a railing? 1  -MS     To walk in hospital room? 1  -MS     AM-PAC 6 Clicks Score (PT) 8  -MS     Highest level of mobility 3 --> Sat at edge of bed  -MS       Row Name 09/08/23 1310 09/08/23 1207       Functional Assessment    Outcome Measure Options AM-PAC 6 Clicks Daily Activity (OT)  -DB AM-PAC 6 Clicks Basic Mobility (PT)  -MS              User Key  (r) = Recorded By, (t) = Taken By, (c) = Cosigned By      Initials Name Provider Type    Zachary Diego, PT Physical Therapist    Carley Reyes OT Occupational Therapist                    Occupational Therapy Education       Title: PT OT SLP Therapies (In Progress)       Topic: Occupational Therapy (Done)       Point: ADL training (Done)       Description:   Instruct learner(s) on proper safety adaptation and remediation techniques during self care or transfers.   Instruct in proper use of assistive devices.                  Learning Progress Summary             Patient Acceptance, E,TB, VU by DB at 9/8/2023 1310                         Point: Home exercise program (Done)       Description:   Instruct learner(s) on appropriate technique for monitoring, assisting and/or progressing therapeutic exercises/activities.                   Learning Progress Summary             Patient Acceptance, E,TB, VU by DB at 9/8/2023 1310                         Point: Precautions (Done)       Description:   Instruct learner(s) on prescribed precautions during self-care and functional transfers.                  Learning Progress Summary             Patient Acceptance, E,TB, VU by DB at 9/8/2023 1310                         Point: Body mechanics (Done)       Description:   Instruct learner(s) on proper positioning and spine alignment during self-care, functional mobility activities and/or exercises.                  Learning Progress Summary             Patient Acceptance, E,TB, VU by DB at 9/8/2023 1310                                         User Key       Initials Effective Dates Name Provider Type Discipline    ROBERTO 07/06/23 -  Carley Guthrie OT Occupational Therapist OT                  OT Recommendation and Plan  Therapy Frequency (OT): 3 times/wk (5x's per week  if indicated-(M-F))  Plan of Care Review  Plan of Care Reviewed With: patient  Progress: no change  Outcome Evaluation: OT sarath completed this date. Pt states she lives alone with assist from daughter occasionally. She states she lives in a 1BR apartment, with rollator walker, shower bench and was (I) with ADLs. Pt able to complete supine to sit EOB with max A x2. Pt min A to maintain upright sitting balance. Kyphosis noted with decreased muscle tone. Pt with increased weakness and was unable to stand. Pt max A for dressing and toileting tasks. Pt to benefit from skilled OT services to address strength, endurance, ADL mgmt, transfers and pain.     Time Calculation:   Evaluation Complexity (OT)  Review Occupational Profile/Medical/Therapy History Complexity: expanded/moderate complexity  Assessment, Occupational Performance/Identification of Deficit Complexity: 3-5 performance deficits  Clinical Decision Making Complexity (OT): detailed assessment/moderate complexity  Overall  Complexity of Evaluation (OT): moderate complexity      Therapy Charges for Today       Code Description Service Date Service Provider Modifiers Qty    [id number] HC OT EVAL MOD COMPLEXITY 3 9/8/2023 Carley Guthrie OT GO 1                 Carley Guthrie OT  9/8/2023

## 2023-09-08 NOTE — CONSULTS
[facility] Cardiology Consult Note    Aron Jacobsen  1950  [id number]  09/08/23    Requesting Physician: No ref. provider found    Chief Complaint: Shortness of breath and lower extremity edema    History of Present Illness:   Mrs. Aron Jacobsen is a 73 y.o. female who is being seen by Cardiology for evaluation of shortness of breath and lower extremity swelling.  The patient has a past medical history significant for prior DVT, IBS, and rheumatoid arthritis.  She does not have any significant past cardiac history.  On review of records, she does have a history of bilateral lower extremity edema, which is suspected to be secondary to chronic venous insufficiency.  She presented to the [facility] for evaluation of bilateral lower extremity swelling as well as dyspnea.  The patient reports a several month history of bilateral lower extremity edema, which has been ongoing since she initially underwent evaluation at [facility] in 6/23.  At that time, it appears a proBNP was within normal limits.  Over the past several weeks, she has had worsening of her lower extremity edema which has been associated with progressive dyspnea.  Denies any associated chest pain or chest discomfort.  No significant episodes of palpitations.  Given worsening of her edema, she presented to the emergency department for evaluation.    Upon arrival in the emergency department, the patient was hemodynamically stable with adequate O2 sats on room air.  Laboratory evaluation was significant for mild leukocytosis with a WBC of 12.6.  On CMP, she was found to have an acute kidney injury with a creatinine of 1.42, compared to 0.453 years ago.  A proBNP was elevated at 1515.  She had a high-sensitivity troponin elevated at 68, which was stable on recheck at 71.  Given concern for underlying CHF and troponin elevation, cardiology has been consulted for further recommendations.    Prior  Cardiac Testin. Last Coronary Angio: None  2. Prior Stress Testing: None  3. Last Echo: None  4. Prior Holter Monitor: None    Review of Systems:   Review of Systems   Constitutional:  Positive for fatigue. Negative for activity change, appetite change, chills, diaphoresis, fever, unexpected weight gain and unexpected weight loss.   Eyes:  Negative for blurred vision and double vision.   Respiratory:  Positive for shortness of breath. Negative for cough, chest tightness and wheezing.    Cardiovascular:  Positive for leg swelling. Negative for chest pain and palpitations.   Gastrointestinal:  Positive for abdominal pain. Negative for anal bleeding, blood in stool and GERD.   Endocrine: Negative for cold intolerance and heat intolerance.   Genitourinary:  Negative for hematuria.   Neurological:  Negative for dizziness, syncope, weakness and light-headedness.   Hematological:  Does not bruise/bleed easily.   Psychiatric/Behavioral:  Negative for depressed mood and stress. The patient is not nervous/anxious.      Past Medical History:   Past Medical History:   Diagnosis Date    Anemia     Arthritis     Baker's cyst     Chronic deep vein thrombosis (DVT) of left upper extremity     IBS (irritable colon syndrome)     Osteoporosis     Rheumatoid arthritis     Vitamin D deficiency        Past Surgical History:   Past Surgical History:   Procedure Laterality Date    BREAST BIOPSY      BREAST SURGERY      HYSTERECTOMY      SHOULDER SURGERY Right     VERTEBROPLASTY         Family History:   Family History   Problem Relation Age of Onset    Lung cancer Mother     Breast cancer Neg Hx        Social History:   Social History     Socioeconomic History    Marital status: Single    Highest education level: 8th grade   Tobacco Use    Smoking status: Never     Passive exposure: Never    Smokeless tobacco: Never   Vaping Use    Vaping Use: Never used   Substance and Sexual Activity    Alcohol use: No    Drug use: No    Sexual  activity: Defer       Medications:     Current Facility-Administered Medications:     acetaminophen (TYLENOL) tablet 650 mg, 650 mg, Oral, Q4H PRN, 650 mg at 09/07/23 2044 **OR** acetaminophen (TYLENOL) 160 MG/5ML solution 650 mg, 650 mg, Oral, Q4H PRN **OR** acetaminophen (TYLENOL) suppository 650 mg, 650 mg, Rectal, Q4H PRN, Kerley, Brian Joseph, DO    azithromycin (ZITHROMAX) 500 mg 0.9% NaCl (Add-vantage) 250 mL, 500 mg, Intravenous, Q24H, Kerley, Brian Joseph, DO    sennosides-docusate (PERICOLACE) 8.6-50 MG per tablet 2 tablet, 2 tablet, Oral, BID **AND** polyethylene glycol (MIRALAX) packet 17 g, 17 g, Oral, Daily PRN **AND** bisacodyl (DULCOLAX) EC tablet 5 mg, 5 mg, Oral, Daily PRN **AND** bisacodyl (DULCOLAX) suppository 10 mg, 10 mg, Rectal, Daily PRN, Kerley, Brian Joseph, DO    Calcium Replacement - Follow Nurse / BPA Driven Protocol, , Does not apply, PRN, Kerley, Brian Joseph, DO    cefTRIAXone (ROCEPHIN) IVPB 1000 mg/50ml dextrose (premix), 1,000 mg, Intravenous, Q24H, Kerley, Brian Joseph, DO    furosemide (LASIX) injection 40 mg, 40 mg, Intravenous, BID, Kerley, Brian Joseph, DO    heparin (porcine) 5000 UNIT/ML injection 5,000 Units, 5,000 Units, Subcutaneous, Q12H, Kerley, Brian Joseph, DO, 5,000 Units at 09/07/23 2100    Magnesium Standard Dose Replacement - Follow Nurse / BPA Driven Protocol, , Does not apply, PRN, Kerley, Brian Joseph, DO    nitroglycerin (NITROSTAT) SL tablet 0.4 mg, 0.4 mg, Sublingual, Q5 Min PRN, Kerley, Brian Joseph, DO    ondansetron (ZOFRAN) injection 4 mg, 4 mg, Intravenous, Q6H PRN, Kerley, Brian Joseph, DO, 4 mg at 09/07/23 1718    Phosphorus Replacement - Follow Nurse / BPA Driven Protocol, , Does not apply, PRN, Kerley, Brian Joseph, DO    Potassium Replacement - Follow Nurse / BPA Driven Protocol, , Does not apply, PRN, Kerley, Brian Joseph, DO    sodium chloride 0.9 % flush 10 mL, 10 mL, Intravenous, Q12H, Kerley, Brian Joseph, DO    sodium chloride 0.9 % flush 10  mL, 10 mL, Intravenous, PRN, Kerley, Brian Joseph,     sodium chloride 0.9 % infusion 40 mL, 40 mL, Intravenous, PRN, Kerley, Brian Joseph, DO    Allergies:   Allergies   Allergen Reactions    Coconut (Cocos Nucifera) Hives    Hctz [Hydrochlorothiazide] Other (See Comments)     NH DOCUMENTATION    Penicillins Itching    Sulfa Antibiotics Itching       Physical Exam:  Vital Signs:   Vitals:    09/07/23 1909 09/07/23 2310 09/08/23 0344 09/08/23 0713   BP: 109/92 113/88 127/92 105/83   BP Location: Right arm Right arm Right arm Right arm   Patient Position: Lying Lying Lying Lying   Pulse: 94 90 109 106   Resp: 16 18 16 16   Temp: 97.4 °F (36.3 °C) 97.6 °F (36.4 °C) 98.2 °F (36.8 °C) 97.2 °F (36.2 °C)   TempSrc: Axillary Axillary Axillary Axillary   SpO2: 90% 100% 100% 100%   Weight:   58.2 kg (128 lb 4.9 oz)    Height:           Physical Exam  Vitals and nursing note reviewed.   Constitutional:       General: She is not in acute distress.     Appearance: She is well-developed. She is not diaphoretic.      Comments: Appears frail   HENT:      Head: Normocephalic and atraumatic.   Eyes:      General: No scleral icterus.     Pupils: Pupils are equal, round, and reactive to light.   Neck:      Trachea: No tracheal deviation.   Cardiovascular:      Rate and Rhythm: Normal rate and regular rhythm.      Heart sounds: Normal heart sounds. No murmur heard.    No friction rub. No gallop.      Comments: Normal JVD.  Pulmonary:      Effort: Pulmonary effort is normal. No respiratory distress.      Breath sounds: No stridor. No wheezing or rales.      Comments: Few scattered crackles bilaterally  Chest:      Chest wall: No tenderness.   Abdominal:      General: Bowel sounds are normal. There is no distension.      Palpations: Abdomen is soft.      Tenderness: There is no abdominal tenderness. There is no guarding or rebound.   Musculoskeletal:         General: No swelling. Normal range of motion.      Cervical back: Neck supple.  No tenderness.      Comments: Bilateral compression stockings in place   Lymphadenopathy:      Cervical: No cervical adenopathy.   Skin:     General: Skin is warm and dry.      Findings: No erythema.   Neurological:      General: No focal deficit present.      Mental Status: She is alert and oriented to person, place, and time.   Psychiatric:         Mood and Affect: Mood normal.         Behavior: Behavior normal.       Results Review:   Results from last 7 days   Lab Units 09/08/23  0618 09/07/23  1416   SODIUM mmol/L 133* 133*   POTASSIUM mmol/L 5.4* 5.3*   CHLORIDE mmol/L 96* 98   CO2 mmol/L 12.3* 11.3*   BUN mg/dL 51* 47*   CREATININE mg/dL 1.38* 1.42*   CALCIUM mg/dL 9.9 10.0   BILIRUBIN mg/dL  --  2.0*   ALK PHOS U/L  --  172*   ALT (SGPT) U/L  --  22   AST (SGOT) U/L  --  37*   GLUCOSE mg/dL 100* 109*     Results from last 7 days   Lab Units 09/07/23  1740 09/07/23  1416   HSTROP T ng/L 71* 68*     Results from last 7 days   Lab Units 09/08/23  0618 09/07/23  1111   WBC 10*3/mm3 12.58* 12.63*   HEMOGLOBIN g/dL 15.3 15.9   HEMATOCRIT % 45.7 49.2*   PLATELETS 10*3/mm3  --  52*         Results from last 7 days   Lab Units 09/07/23  1416   MAGNESIUM mg/dL 2.1           I personally viewed and interpreted the patient's EKG/Telemetry data     Assessment:    1.  Volume overload  -- Suspect lower extremity edema multifactorial secondary to chronic venous insufficiency in addition to a component of diastolic heart failure  -- In addition to edema, has evidence of volume overload with elevated proBNP and pulmonary edema on imaging  -- Echocardiogram shows preserved LV systolic function, diastolic function not well assessed due to being a technically difficult study  -- Agree with IV Lasix for diuresis while closely monitoring renal function    2.  Acute kidney injury  --Suspect a component of cardiorenal syndrome in the setting of volume overload  --Renal function improving with diuresis, monitor closely    3.   Elevated troponin level  -- Suspect demand ischemia in the setting of volume overload and RENE  -- No significant ischemic changes on ECG  -- No anginal symptoms, low suspicion for ACS with no regional wall motion abnormalities on echocardiogram  -- Would consider noninvasive ischemic evaluation as outpatient given cardiovascular risk factors        Thank you for allowing me to participate in the care of your patient. Please do not hesitate to contact me with additional questions or concerns.     JENNIFER Thayer MD  Interventional Cardiology   09/08/23  08:36 EDT

## 2023-09-08 NOTE — PLAN OF CARE
Goal Outcome Evaluation:  Plan of Care Reviewed With: patient        Progress: no change  Outcome Evaluation: OT sarath completed this date. Pt states she lives alone with assist from daughter occasionally. She states she lives in a 1BR apartment, with rollator walker, shower bench and was (I) with ADLs. Pt able to complete supine to sit EOB with max A x2. Pt min A to maintain upright sitting balance. Kyphosis noted with decreased muscle tone. Pt with increased weakness and was unable to stand. Pt max A for dressing and toileting tasks. Pt to benefit from skilled OT services to address strength, endurance, ADL mgmt, transfers and pain.      Anticipated Discharge Disposition (OT):  (STR)

## 2023-09-08 NOTE — CONSULTS
[facility]      Nephrology Consultation      Referring Provider:   No ref. provider found    Reason for Consultation:  Acute Kidney Injury and associated problems.    Subjective:  Chief complaint   Chief Complaint   Patient presents with    Leg Swelling    Weakness - Generalized     History of present illness:    Patient is 83-year-old female with a history of rheumatoid arthritis and DVT who presented to the emergency room after noting to have increased bilateral lower extremity edema with some worsening back pain.  In the ER she was noted to have some worsening renal function, along with evidence of CHF he was admitted to the hospitalist service.  Cardiac evaluation has been ordered cardiology is also seen the patient I was consulted for increased edema and volume management as well as worsening renal function.  Patient is awake alert and interactive he is still complaining of some shortness of breath but denies any chest pain, she said off and on she did have some chest pains over the last few days.  She did mention that she has been taking Celebrex for years and said that it really does not help any be okay not to take it.  This was after discussion about worsening of function and Celebrex use.  She does remember that I took care of her  and her son-in-law.  I have reviewed labs/imaging/records from this hospitalization, including ER staff and admitting/attending physicians H/P's and progress notes to establish a comprehensive understanding of this patient's clinical hospital course, as well as to establish plan of care appropriately.   Past Medical History:   Diagnosis Date    Anemia     Arthritis     Baker's cyst     Chronic deep vein thrombosis (DVT) of left upper extremity     IBS (irritable colon syndrome)     Osteoporosis     Rheumatoid arthritis     Vitamin D deficiency        Past Surgical History:   Procedure Laterality Date    BREAST BIOPSY      BREAST SURGERY       HYSTERECTOMY      SHOULDER SURGERY Right     VERTEBROPLASTY       Family History   Problem Relation Age of Onset    Lung cancer Mother     Breast cancer Neg Hx          Social History     Tobacco Use    Smoking status: Never     Passive exposure: Never    Smokeless tobacco: Never   Vaping Use    Vaping Use: Never used   Substance Use Topics    Alcohol use: No    Drug use: No     Home medications:   Prior to Admission Medications       Prescriptions Last Dose Informant Patient Reported? Taking?    celecoxib (CeleBREX) 200 MG capsule Past Week  Yes Yes    Take 1 capsule by mouth Daily.    Adalimumab (HUMIRA PEN SC) Unknown  Yes No    Inject 40 BAU/day under the skin into the appropriate area as directed.    predniSONE (DELTASONE) 20 MG tablet Unknown  No No    Take 1 tablet by mouth 2 (Two) Times a Day.    Patient not taking:  Reported on 9/7/2023          Emergency department medications:   Medications   sodium chloride 0.9 % flush 10 mL (10 mL Intravenous Not Given 9/8/23 0805)   sodium chloride 0.9 % flush 10 mL (has no administration in time range)   sodium chloride 0.9 % infusion 40 mL (has no administration in time range)   acetaminophen (TYLENOL) tablet 650 mg (650 mg Oral Given 9/7/23 2044)     Or   acetaminophen (TYLENOL) 160 MG/5ML solution 650 mg ( Oral Not Given:  See Alt 9/7/23 2044)     Or   acetaminophen (TYLENOL) suppository 650 mg ( Rectal Not Given:  See Alt 9/7/23 2044)   sennosides-docusate (PERICOLACE) 8.6-50 MG per tablet 2 tablet (2 tablets Oral Given 9/8/23 0913)     And   polyethylene glycol (MIRALAX) packet 17 g (has no administration in time range)     And   bisacodyl (DULCOLAX) EC tablet 5 mg (has no administration in time range)     And   bisacodyl (DULCOLAX) suppository 10 mg (has no administration in time range)   ondansetron (ZOFRAN) injection 4 mg (4 mg Intravenous Given 9/7/23 1718)   heparin (porcine) 5000 UNIT/ML injection 5,000 Units (5,000 Units Subcutaneous Given 9/8/23 0913)  "  nitroglycerin (NITROSTAT) SL tablet 0.4 mg (has no administration in time range)   Potassium Replacement - Follow Nurse / BPA Driven Protocol (has no administration in time range)   Magnesium Standard Dose Replacement - Follow Nurse / BPA Driven Protocol (has no administration in time range)   Phosphorus Replacement - Follow Nurse / BPA Driven Protocol (has no administration in time range)   Calcium Replacement - Follow Nurse / BPA Driven Protocol (has no administration in time range)   cefTRIAXone (ROCEPHIN) IVPB 1000 mg/50ml dextrose (premix) (has no administration in time range)   azithromycin (ZITHROMAX) 500 mg 0.9% NaCl (Add-vantage) 250 mL (has no administration in time range)   furosemide (LASIX) injection 40 mg (has no administration in time range)   sodium zirconium cyclosilicate (LOKELMA) pack 10 g (has no administration in time range)   insulin regular (humuLIN R,novoLIN R) injection 10 Units (has no administration in time range)   dextrose (D50W) (25 g/50 mL) IV injection 25 mL (has no administration in time range)   furosemide (LASIX) injection 40 mg (40 mg Intravenous Given 9/7/23 1513)   cefTRIAXone (ROCEPHIN) IVPB 1000 mg/50ml dextrose (premix) (1,000 mg Intravenous New Bag 9/7/23 1623)   azithromycin (ZITHROMAX) 500 mg 0.9% NaCl (Add-vantage) 250 mL (500 mg Intravenous New Bag 9/7/23 1838)       Allergies:  Coconut (cocos nucifera), Hctz [hydrochlorothiazide], Penicillins, and Sulfa antibiotics    Review of Systems  All review of system were reviewed and are negative except as mentioned in the history of present illness and assessment and plan.      Objective:  Vital Signs  /83 (BP Location: Right arm, Patient Position: Lying)   Pulse 106   Temp 97.2 °F (36.2 °C) (Axillary)   Resp 16   Ht 157.5 cm (62.01\")   Wt 58.2 kg (128 lb 4.9 oz)   SpO2 100%   BMI 23.46 kg/m²          No intake/output data recorded.    Intake/Output Summary (Last 24 hours) at 9/8/2023 1050  Last data filed at " 9/7/2023 2300  Gross per 24 hour   Intake 600 ml   Output --   Net 600 ml       Physical Exam:  General Appearance:   Alert, cooperative, in no acute distress.     Head:   Normocephalic, without obvious abnormality, atraumatic.     Eyes:      Normal, conjunctivae and sclerae, no icterus, no pallor, corneas clear, PERRLA        Throat:   Oral mucosa dry      Neck:  No adenopathy, supple, trachea midline, no thyromegaly, no carotid bruit, no JVD        Lungs:   Fine crackles all over the chest.      Heart::   Regular rhythm and normal rate, normal S1 and S2.       Abdomen:   Obese. Normal bowel sounds, no masses, no organomegaly, soft non-tender, non-distended, no guarding, no rebound tenderness      Genital urinary:   No urinary bladder palpable      Extremities:  Moves all extremities, 2-3+ edema, no cyanosis, no redness.     Pulses:  Pulses palpable and equal bilaterally but weak.     Skin:  No bleeding, bruising or rash        Neurologic:  Cranial nerves grossly intact, move all extremities         Results Review:   Results from last 7 days   Lab Units 09/08/23 0618 09/07/23  1416   SODIUM mmol/L 133* 133*   POTASSIUM mmol/L 5.4* 5.3*   CHLORIDE mmol/L 96* 98   CO2 mmol/L 12.3* 11.3*   BUN mg/dL 51* 47*   CREATININE mg/dL 1.38* 1.42*   CALCIUM mg/dL 9.9 10.0   ALBUMIN g/dL  --  3.9   BILIRUBIN mg/dL  --  2.0*   ALK PHOS U/L  --  172*   ALT (SGPT) U/L  --  22   AST (SGOT) U/L  --  37*   GLUCOSE mg/dL 100* 109*     Estimated Creatinine Clearance: 33.9 mL/min (A) (by C-G formula based on SCr of 1.38 mg/dL (H)).  Results from last 7 days   Lab Units 09/07/23  1416   MAGNESIUM mg/dL 2.1         Results from last 7 days   Lab Units 09/08/23  0618 09/07/23  1111   WBC 10*3/mm3 12.58* 12.63*   HEMOGLOBIN g/dL 15.3 15.9   PLATELETS 10*3/mm3  --  52*         Brief Urine Lab Results       None          No results found for: UTPCR  Imaging Results (Last 24 Hours)       Procedure Component Value Units Date/Time    XR Chest 1  View [[id number]] Collected: 09/07/23 1151     Updated: 09/07/23 1155    Narrative:      CLINICAL INDICATION:    SOB, c/f CHF shortness of breath.     EXAMINATION TECHNIQUE:  XR CHEST 1 VW-     COMPARISON:  Chest radiograph 6/23/2018.     FINDINGS:  Stable mild cardiomegaly. Aortic atherosclerosis, tortuosity and  ectasia. Bilateral hilar fullness, likely enlarged pulmonary  vasculature. No overt edema. Mildly prominent interstitial markings.  Small bilateral pleural effusions, left greater than right. Bibasilar  atelectasis. No pneumothorax. Right reverse shoulder arthroplasty  hardware in place. There is subtle periosteal reaction along the right  humeral mid diaphysis surrounding the tip of the humeral stem component,  suggestive of stress reaction. Severe left glenohumeral joint  degenerative changes.       Impression:      Mild congestive heart failure.     Images personally reviewed, interpreted and dictated by MARK James.              This report was signed and finalized on 9/7/2023 11:53 AM by MARK James.             azithromycin, 500 mg, Intravenous, Q24H  cefTRIAXone, 1,000 mg, Intravenous, Q24H  dextrose, 25 mL, Intravenous, Once  furosemide, 40 mg, Intravenous, BID  heparin (porcine), 5,000 Units, Subcutaneous, Q12H  insulin regular, 10 Units, Subcutaneous, Once  senna-docusate sodium, 2 tablet, Oral, BID  sodium chloride, 10 mL, Intravenous, Q12H  sodium zirconium cyclosilicate, 10 g, Oral, TID           Assessment/Plan:    Acute kidney injury: Patient has been taking Celebrex for years likely has some component of chronic kidney disease but there is not much records available at this point to see, there may be some component of acute worsening secondary to hemodynamic abnormalities.  There is no UA done since admission.  She does have increased potassium and low bicarb.  Metabolic acidosis: We will go ahead and start her on oral sodium bicarb once her bicarb improves the  potassium will improve with it.  Hypertension: Blood pressures under fair control once we have the volume status better I think she will need minimal amount of antihypertensives.  Consider beta-blockers.  Fluid overload: Clearly patient appears to be fluid overloaded.  She will need adjustments for her diuretics.  Coronary artery disease: Cardiac consult noted, echo is being performed.  Rheumatoid arthritis: We will stop the Celebrex patient is well aware of it.  Bacterial pneumonia: Suspicion of bacterial pneumonia being treated as per hospitalist service.    Risk and complexity: High     Plan:  I will stop the 40 mg of Lasix give her Bumex 4 mg every 6 hours x2 doses today and see how she will respond to it.  Check UA and uric acid.  Stop Lokelma.  Continue with rest of the current treatment plan and surveillance labs.  Details were discussed with the patient no family in the room.    Details were also discussed with the hospitalist service.   Further recommendations will depend on clinical course of the patient during the current hospitalization.    I also discussed the details with the nursing staff.  Rest as ordered.    In closing, I sincerely appreciate opportunity to participate in care of this patient. If I can be of any further assistance with the management of this patient, please don’t hesitate to contact me.    Warren Hoff MD, FASN    09/08/23  10:50 EDT    Dictated using Dragon.

## 2023-09-08 NOTE — PLAN OF CARE
"Goal Outcome Evaluation:  Plan of Care Reviewed With: patient        Progress: no change  Outcome Evaluation: Bedside eval of swallow completed with pt. seated upright in bed for po trials. Pt. was edentulous, has dentures at home, but no difficulty per pt. report with chewing most regular solids. Pt. was given trials of regular solid and thin liquid (pt. declined pureed or pudding trials). Oral phase was WFL considering edentulous. No overt s/s aspiration or other pharyngeal phase dysphagia exhibited with any consistency trialed. Pt. did report some difficulty with liquids \"coming back up\" at times which may indicate possible reflux. MD may wish to consider medical management for reflux as approrpriate for symptoms. No hx of dysphagia and RN reported no observed difficulty. Recommend: 1. continue regular diet with thin liq as ruth, 2. meds whole with thin liq as ruth, 3. aspiration precautions, 4. reflux precautions. D/W pt. and RN.         "

## 2023-09-08 NOTE — NURSING NOTE
Dr. Ordaz called by this RN to inform of infiltration site at IV site in L AC. Pictures had been taken and attached in patient chart. Dr. Ordaz put in orders accordingly, awaiting results at this time

## 2023-09-08 NOTE — PROGRESS NOTES
[facility]    PROGRESS NOTE    Name:  Aron Jacobsen   Age:  73 y.o.  Sex:  female  :  1950  MRN:  [id number]   Visit Number:  [id number]  Admission Date:  2023  Date Of Service:  23  Primary Care Physician:  Diane Good MD     LOS: 1 day :    Chief Complaint:      Follow-up; bilateral lower extremity swelling    Subjective:    Breathing feels okay, denies any chest pain.  Has localized pain in her left arm from the IV was just placed.    Hospital Course:    Patient is a 73-year-old woman, lives at home alone, normally can ambulate but does not drive, with a past medical history of hypertension, rheumatoid arthritis, migraines.  Was evaluated in 2023 at  for bilateral lower extremity swelling, venous duplex was normal at that time, she was encouraged to elevate and use compression stockings.  Presented to [facility] ED on 2023 with concern for bilateral pedal edema, abdominal discomfort, diaphoresis, back pain, reportedly increasingly worse over several weeks.  Her shortness of breath in particular is worse in the past 4 to 5 days.  Denied any fevers or chills, vomiting.     ED summary: Afebrile, vital signs stable on 3 L nasal cannula.  EKG sinus tachycardia rate 118, no ST elevations or depressions.  High-sensitivity troponin 68, ACS not initially suspected.  proBNP over 1500.  Sodium 133, potassium 5.3, CO2 11, anion gap 23, BUN 47, creatinine 1.42 with most recent 0.45 in 2019, CRP 8.19, procalcitonin 0.70, white count 12, hemoglobin 15.9.  COVID/flu negative.  CXR mild congestive heart failure.  Suspected new onset heart failure with hypoxia and bilateral lower extremity edema.  Also possible patient may have had a cardiac event weeks prior to her edema starting.  She is Lasix naïve, was provided 40 mg IV, also provided Rocephin with elevated procalcitonin.    Inpatient general floor admission 2023 with hypoxia and bilateral  lower extremity edema suspected due to CHF. Also with RENE, may be from volume overload. Suspected component of bacterial pneumonia with elevated procalcitonin.     Review of Systems:     All systems were reviewed and negative except as mentioned in subjective, assessment and plan.    Vital Signs:    Temp:  [97.2 °F (36.2 °C)-98.2 °F (36.8 °C)] 98 °F (36.7 °C)  Heart Rate:  [] 106  Resp:  [16-18] 18  BP: (105-136)/() 108/80    Intake and output:    I/O last 3 completed shifts:  In: 600 [P.O.:600]  Out: -   I/O this shift:  In: 360 [P.O.:360]  Out: -     Physical Examination:    General Appearance:  Alert and cooperative.    Head:  Atraumatic and normocephalic.   Eyes: Conjunctivae and sclerae normal, no icterus. No pallor.   Throat: No oral lesions, no thrush, oral mucosa moist.   Neck: Supple, trachea midline, no thyromegaly.   Lungs:   Breath sounds heard bilaterally equally.  No wheezing or crackles. No Pleural rub or bronchial breathing.   Heart:  Normal S1 and S2, no murmur, no gallop, no rub. No JVD.   Abdomen:   Normal bowel sounds, no masses, no organomegaly. Soft, nontender, nondistended, no rebound tenderness.   Extremities: Bilateral lower extremity pitting edema   Skin: Warm.   Neurologic: Alert and oriented x 3. No facial asymmetry. Moves all four limbs. No tremors.      Laboratory results:    Results from last 7 days   Lab Units 09/08/23 0618 09/07/23  1416   SODIUM mmol/L 133* 133*   POTASSIUM mmol/L 5.4* 5.3*   CHLORIDE mmol/L 96* 98   CO2 mmol/L 12.3* 11.3*   BUN mg/dL 51* 47*   CREATININE mg/dL 1.38* 1.42*   CALCIUM mg/dL 9.9 10.0   BILIRUBIN mg/dL  --  2.0*   ALK PHOS U/L  --  172*   ALT (SGPT) U/L  --  22   AST (SGOT) U/L  --  37*   GLUCOSE mg/dL 100* 109*     Results from last 7 days   Lab Units 09/08/23  0618 09/07/23  1111   WBC 10*3/mm3 12.58* 12.63*   HEMOGLOBIN g/dL 15.3 15.9   HEMATOCRIT % 45.7 49.2*   PLATELETS 10*3/mm3  --  52*         Results from last 7 days   Lab Units  09/07/23  1740 09/07/23  1416   HSTROP T ng/L 71* 68*         No results for input(s): PHART, ANA1KFT, PO2ART, MWZ3QQU, BASEEXCESS in the last 8760 hours.   I have reviewed the patient's laboratory results.    Radiology results:    Adult Transthoracic Echo Complete w/ Color, Spectral and Contrast if necessary per protocol    Result Date: 9/8/2023  1.  Technically difficult study 2.  Normal left ventricular size and systolic function, LVEF 60-65%. 3.  Mild to moderate concentric LVH. 4.  Unable to fully assess diastolic function on current study. 5.  RV/RA not well visualized on current study. 6.  Normal left atrial size. 7.  No significant aortic, mitral, or pulmonic valve abnormalities.  Tricuspid valve not assessed.     XR Chest 1 View    Result Date: 9/7/2023  CLINICAL INDICATION:  SOB, c/f CHF shortness of breath.  EXAMINATION TECHNIQUE: XR CHEST 1 VW-  COMPARISON: Chest radiograph 6/23/2018.  FINDINGS: Stable mild cardiomegaly. Aortic atherosclerosis, tortuosity and ectasia. Bilateral hilar fullness, likely enlarged pulmonary vasculature. No overt edema. Mildly prominent interstitial markings. Small bilateral pleural effusions, left greater than right. Bibasilar atelectasis. No pneumothorax. Right reverse shoulder arthroplasty hardware in place. There is subtle periosteal reaction along the right humeral mid diaphysis surrounding the tip of the humeral stem component, suggestive of stress reaction. Severe left glenohumeral joint degenerative changes.      Impression: Mild congestive heart failure.  Images personally reviewed, interpreted and dictated by MARK James.     This report was signed and finalized on 9/7/2023 11:53 AM by MARK James.     I have reviewed the patient's radiology reports.    Medication Review:     I have reviewed the patient's active and prn medications.     Problem List:      Fluid overload      Assessment/Plan:    At time of admission patient resting comfortably in  bed, pleasantly conversational but does seem to be in mild distress from difficulty with getting an IV.  She is mostly concerned with her localized pain in the left arm from the IV.     Updated daughter Ania via phone. [phone]     Hypoxia  CHF, suspected  Volume overload  Bacterial pneumonia, suspected    Supplemental oxygen as needed keep saturation above 90%.  Echocardiogram good systolic function and indeterminate diastolic dysfunction, likely does have diastolic heart failure.  Lasix.  Rocephin and azithromycin started 9/7.  Cardiology consultation, recommendations appreciated; agreed with diuresis and bilateral lower extremity swelling is likely multifactorial with venous insufficiency and heart failure.  Cardiology recommends outpatient follow-up for noninvasive ischemic evaluation.    RENE  Nephrology consultation, recommendations appreciated.    Impaired Mobility and ADLs  PT/OT    Thrombocytopenia  Peripheral smear ordered. DC'd heparin.      Chronic:  hypertension, rheumatoid arthritis, migraines     Continue other home medications     Risk Assessment: High  DVT Prophylaxis: SCDs  Code Status: DNR/DNI  Diet: Heart healthy, fluid restriction  Discharge Plan: Likely will need STR    Brian Joseph Kerley, DO  09/08/23  15:02 EDT    Dictated utilizing Dragon dictation.

## 2023-09-08 NOTE — PLAN OF CARE
"Goal Outcome Evaluation:              Outcome Evaluation: Vital signs currently stable on 1L NC. Patient's IV infiltrated in left AC/forearm. Unable to obtain IV access since. Explained to patient the possibility of a central line. Patient stated that she did not want to be stuck again tonight and wanted to \"talk to the doctor in the morning about central line.\" Patient has rested well throughout the night. Pain controlled with PRN Tylenol.         "

## 2023-09-08 NOTE — ANESTHESIA PROCEDURE NOTES
Peripheral IV    Patient location during procedure: floor  Start time: 9/8/2023 11:15 AM  End time: 9/8/2023 11:30 AM  Line placed for Difficult Access and Fluids/Medication Admin.  Performed By   CRNA/CAA: Mark Tovar CRNA  Preanesthetic Checklist  Completed: patient identified, IV checked, site marked, risks and benefits discussed, surgical consent, monitors and equipment checked, pre-op evaluation and timeout performed  Peripheral IV Prep   Patient position: supine   Prep: ChloraPrep  Peripheral IV Procedure   Laterality:left  Location:  Forearm  Catheter size: 20 G  Guidance: ultrasound guided          Post Assessment   Dressing Type: tape and transparent.    IV Dressing/Site: clean  Additional Notes  Called by Dr. Denny to floor to start piv

## 2023-09-08 NOTE — PLAN OF CARE
Problem: Adult Inpatient Plan of Care  Goal: Plan of Care Review  Outcome: Ongoing, Progressing  Flowsheets (Taken 9/8/2023 6485)  Progress: no change  Plan of Care Reviewed With: patient   Goal Outcome Evaluation:  Plan of Care Reviewed With: patient        Progress: no change          Received report from ROSA Santos at 1700. Patient had an IV in left AC that infiltrated. An ultrasound guided IV was inserted in the pts L upper chest. Plan of care reviewed with the patient. Will continue to monitor.

## 2023-09-08 NOTE — THERAPY EVALUATION
Acute Care - Speech Language Pathology   Swallow Initial Evaluation  [facility]     Patient Name: Aron Jacobsen  : 1950  MRN: [id number]  Today's Date: 2023               Admit Date: 2023    Visit Dx:     ICD-10-CM ICD-9-CM   1. Hypervolemia, unspecified hypervolemia type  E87.70 276.69     Patient Active Problem List   Diagnosis    History of total left hip arthroplasty    Closed fracture of head of left femur    Fluid overload     Past Medical History:   Diagnosis Date    Anemia     Arthritis     Baker's cyst     Chronic deep vein thrombosis (DVT) of left upper extremity     IBS (irritable colon syndrome)     Impaired mobility     Osteoporosis     Rheumatoid arthritis     Vitamin D deficiency      Past Surgical History:   Procedure Laterality Date    BREAST BIOPSY      BREAST SURGERY      HYSTERECTOMY      SHOULDER SURGERY Right     VERTEBROPLASTY         SLP Recommendation and Plan  SLP Swallowing Diagnosis: functional oral phase, functional pharyngeal phase, suspected esophageal dysphagia (23)  SLP Diet Recommendation: regular textures, thin liquids (23)  Recommended Precautions and Strategies: upright posture during/after eating, reflux precautions, general aspiration precautions (23)  SLP Rec. for Method of Medication Administration: meds whole, with thin liquids, as tolerated (23)     Monitor for Signs of Aspiration: notify SLP if any concerns (23)  Recommended Diagnostics: No further SLP services recommended (23)  Swallow Criteria for Skilled Therapeutic Interventions Met: no problems identified which require skilled intervention (23)  Anticipated Discharge Disposition (SLP): unknown (23)     Therapy Frequency (Swallow): evaluation only (23)     Oral Care Recommendations: Denture Care, Oral Care BID/PRN, Swab (23)     Swallowing Considerations per Physician Discretion: medical  "management of suspected esophageal dysphagia, as indicated (09/08/23 1411)                                Oral Care Recommendations: Denture Care, Oral Care BID/PRN, Swab (09/08/23 1411)    Plan of Care Reviewed With: patient  Progress: no change  Outcome Evaluation: Bedside eval of swallow completed with pt. seated upright in bed for po trials. Pt. was edentulous, has dentures at home, but no difficulty per pt. report with chewing most regular solids. Pt. was given trials of regular solid and thin liquid (pt. declined pureed or pudding trials). Oral phase was WFL considering edentulous. No overt s/s aspiration or other pharyngeal phase dysphagia exhibited with any consistency trialed. Pt. did report some difficulty with liquids \"coming back up\" at times which may indicate possible reflux. MD may wish to consider medical management for reflux as approrpriate for symptoms. No hx of dysphagia and RN reported no observed difficulty. Recommend: 1. continue regular diet with thin liq as ruth, 2. meds whole with thin liq as ruth, 3. aspiration precautions, 4. reflux precautions. D/W pt. and RN.      SWALLOW EVALUATION (last 72 hours)       SLP Adult Swallow Evaluation       Row Name 09/08/23 1411                   Rehab Evaluation    Document Type evaluation  -TM        Subjective Information complains of  reduced strength  -TM        Patient Observations alert;cooperative  -TM        Patient/Family/Caregiver Comments/Observations no family present  -TM        Patient Effort good  -TM           General Information    Patient Profile Reviewed yes  -TM        Pertinent History Of Current Problem L-hip, RA  -TM        Current Method of Nutrition regular textures;thin liquids  -TM        Precautions/Limitations, Vision difficult to assess  -TM        Precautions/Limitations, Hearing WFL  -TM        Prior Level of Function-Communication WFL  -TM        Prior Level of Function-Swallowing no diet consistency restrictions  -TM     "    Plans/Goals Discussed with patient;other (see comments)  RN  -TM        Barriers to Rehab none identified  -TM        Patient's Goals for Discharge patient did not state  -TM           Pain    Additional Documentation Pain Scale: Numbers Pre/Post-Treatment (Group)  -TM           Pain Scale: Numbers Pre/Post-Treatment    Pretreatment Pain Rating 0/10 - no pain  -TM        Posttreatment Pain Rating 0/10 - no pain  -TM           Oral Motor Structure and Function    Oral Lesions or Structural Abnormalities and/or variants none identified  -TM        Dentition Assessment edentulous, dentures not available  -TM        Secretion Management WNL/WFL  -TM        Mucosal Quality moist, healthy  -TM        Volitional Cough WFL  -TM           Oral Musculature and Cranial Nerve Assessment    Oral Motor General Assessment WFL  -TM           General Eating/Swallowing Observations    Respiratory Support Currently in Use nasal cannula  -TM        O2 Liters 1L  -TM        Eating/Swallowing Skills fed by SLP  -TM        Positioning During Eating upright in bed  -TM        Utensils Used straw  -TM        Consistencies Trialed regular textures;thin liquids  -TM        Pre SpO2 (%) 93  -TM        Post SpO2 (%) 93  -TM           Respiratory    Respiratory Status WFL;during swallowing/eating  -TM           Clinical Swallow Eval    Oral Prep Phase WFL  edentulous may affect mastication with some regular solids  -TM        Oral Transit WFL  -TM        Oral Residue WFL  -TM        Pharyngeal Phase no overt signs/symptoms of pharyngeal impairment  -TM        Esophageal Phase suspected esophageal impairment  -TM        Clinical Swallow Evaluation Summary Bedside eval of swallow completed with pt. seated upright in bed for po trials.  Pt. was edentulous, has dentures at home, but no difficulty per pt. report with chewing most regular solids.  Pt. was given trials of regular solid and thin liquid (pt. declined pureed or pudding trials).  Oral  "phase was WFL considering edentulous.  No overt s/s aspiration or other pharyngeal phase dysphagia exhibited with any consistency trialed.  Pt. did report some difficulty with liquids \"coming back up\" at times which may indicate possible reflux.  MD may wish to consider medical management for reflux as approrpriate for symptoms.  No hx of dysphagia and RN reported no observed difficulty.  Recommend:  1. continue regular diet with thin liq as ruth, 2. meds whole with thin liq as ruth, 3. aspiration precautions, 4. reflux precautions. D/W pt. and RN.  -TM           Oral Prep Concerns    Oral Prep Concerns increased prep time  -TM        Increased Prep Time regular consistencies  -TM           Esophageal Phase Concerns    Esophageal Phase Concerns other (see comments)  c/o liquids \"coming back up\"  -           SLP Evaluation Clinical Impression    SLP Swallowing Diagnosis functional oral phase;functional pharyngeal phase;suspected esophageal dysphagia  -        Functional Impact risk of aspiration/pneumonia  of refluxate if not well managed  -        Swallow Criteria for Skilled Therapeutic Interventions Met no problems identified which require skilled intervention  -           Recommendations    Therapy Frequency (Swallow) evaluation only  -        SLP Diet Recommendation regular textures;thin liquids  -        Recommended Diagnostics No further SLP services recommended  -        Recommended Precautions and Strategies upright posture during/after eating;reflux precautions;general aspiration precautions  -        Oral Care Recommendations Denture Care;Oral Care BID/PRN;Swab  -        SLP Rec. for Method of Medication Administration meds whole;with thin liquids;as tolerated  -        Monitor for Signs of Aspiration notify SLP if any concerns  -        Anticipated Discharge Disposition (SLP) unknown  -TM        Swallowing Considerations per Physician Discretion medical management of suspected " esophageal dysphagia, as indicated  -                  User Key  (r) = Recorded By, (t) = Taken By, (c) = Cosigned By      Initials Name Effective Dates    TM Simran Sosa 06/16/21 -                     EDUCATION  The patient has been educated in the following areas:   Dysphagia (Swallowing Impairment) Oral Care/Hydration.              Time Calculation:    Time Calculation- SLP       Row Name 09/08/23 1522             Time Calculation- SLP    SLP Start Time 1411  -      SLP Received On 09/08/23  -         Untimed Charges    SLP Eval/Re-eval  ST Eval Oral Pharyng Swallow - 51026  -                User Key  (r) = Recorded By, (t) = Taken By, (c) = Cosigned By      Initials Name Provider Type     Simran Sosa Speech and Language Pathologist                    Therapy Charges for Today       Code Description Service Date Service Provider Modifiers Qty    [id number]  ST EVAL ORAL PHARYNG SWALLOW 4 9/8/2023 Simran Sosa GN 1                 Simran Sosa  9/8/2023

## 2023-09-08 NOTE — PLAN OF CARE
Goal Outcome Evaluation:  Plan of Care Reviewed With: patient        Progress: no change  Outcome Evaluation: Pt participated in PT eval this date. Pt reports she lives alone and is normall independent with use of rollator. Pt transferred to EOB this date with maxA x2. Pt was unable to maintain static sitting balance without Isela. Pt leaning posteriorly, significant kyphosis and poor upright posture. Pt sat EOb for 5 mins however unable to progress mobility this date due to imapired strength. Pt returned to supine with maxA x2. Pt is expected to benefit from skilled PTx during this inpatient stay and was open to STR at discharge.      Anticipated Discharge Disposition (PT): home with assist, home with home health

## 2023-09-08 NOTE — PROGRESS NOTES
Pharmacy Consult-Vancomycin Dosing  Aron Jacobsen is a  73 y.o. female receiving vancomycin therapy.     Indication: Skin and Soft Tissue Infection  Consulting Provider: Sushma    Goal Trough: 15-20 mcg/mL    Current Antimicrobial Therapy  Anti-Infectives (From admission, onward)      Ordered     Dose/Rate Route Frequency Start Stop    09/08/23 1812  vancomycin 1250 mg/250 mL 0.9% NS IVPB ([facility])        Ordering Provider: Kerley, Brian Joseph, DO    1,250 mg  over 75 Minutes Intravenous Once 09/08/23 1900      09/08/23 1804  Pharmacy to dose vancomycin        Ordering Provider: Harinder Ordaz DO     Does not apply Continuous PRN 09/08/23 1804 09/13/23 1803    09/07/23 1629  azithromycin (ZITHROMAX) 500 mg 0.9% NaCl (Add-vantage) 250 mL        Ordering Provider: Kerley, Brian Joseph, DO    500 mg  250 mL/hr over 60 Minutes Intravenous Every 24 Hours 09/08/23 1600 09/10/23 1559    09/07/23 1629  cefTRIAXone (ROCEPHIN) IVPB 1000 mg/50ml dextrose (premix)        Ordering Provider: Kerley, Brian Joseph, DO    1,000 mg  100 mL/hr over 30 Minutes Intravenous Every 24 Hours 09/08/23 1500 09/12/23 1459    09/07/23 1541  cefTRIAXone (ROCEPHIN) IVPB 1000 mg/50ml dextrose (premix)        Ordering Provider: Jim Varma MD    1,000 mg  100 mL/hr over 30 Minutes Intravenous Once 09/07/23 1557 09/07/23 1653    09/07/23 1541  azithromycin (ZITHROMAX) 500 mg 0.9% NaCl (Add-vantage) 250 mL        Ordering Provider: Kerley, Brian Joseph, DO    500 mg  250 mL/hr over 60 Minutes Intravenous Once 09/07/23 1557 09/07/23 1938            Allergies  Allergies as of 09/07/2023 - Reviewed 09/07/2023   Allergen Reaction Noted    Coconut (cocos nucifera) Hives 06/23/2018    Hctz [hydrochlorothiazide] Other (See Comments) 03/11/2020    Penicillins Itching 06/23/2018    Sulfa antibiotics Itching 06/23/2018       Labs    Results from last 7 days   Lab Units 09/08/23  0618 09/07/23  1416   BUN mg/dL 51* 47*   CREATININE mg/dL 1.38*  "1.42*       Results from last 7 days   Lab Units 09/08/23  0618 09/07/23  1111   WBC 10*3/mm3 12.58* 12.63*       Evaluation of Dosing     Last Dose Received in the ED/Outside Facility: No  Is Patient on Dialysis or Renal Replacement: No    Ht - 157.5 cm (62.01\")  Wt - 58.2 kg (128 lb 4.9 oz)    Estimated Creatinine Clearance: 33.9 mL/min (A) (by C-G formula based on SCr of 1.38 mg/dL (H)).    Intake & Output (last 3 days)         09/05 0701 09/06 0700 09/06 0701 09/07 0700 09/07 0701 09/08 0700 09/08 0701 09/09 0700    P.O.   600 540    IV Piggyback    300    Total Intake(mL/kg)   600 (10.3) 840 (14.4)    Net   +600 +840            Urine Unmeasured Occurrence   1 x 2 x            Microbiology and Radiology  Microbiology Results (last 10 days)       Procedure Component Value - Date/Time    COVID-19 and FLU A/B PCR - Swab, Nasopharynx [[id number]]  (Normal) Collected: 09/07/23 1110    Lab Status: Final result Specimen: Swab from Nasopharynx Updated: 09/07/23 1153     COVID19 Not Detected     Influenza A PCR Not Detected     Influenza B PCR Not Detected    Narrative:      Fact sheet for providers: https://www.fda.gov/media/480695/download    Fact sheet for patients: https://www.fda.gov/media/304484/download    Test performed by PCR.    Blood Culture - Blood, Arm, Right [[id number]]  (Normal) Collected: 09/07/23 0600    Lab Status: Preliminary result Specimen: Blood from Arm, Right Updated: 09/08/23 1631     Blood Culture No growth at 24 hours            Vancomycin Levels:                      Assessment/Plan    Pharmacy to dose vancomycin for skin and soft tissue infection. Goal trough 15-20 mcg/mL.  Patient currently receiving vancomycin dosed per level. Will order vancomycin 1250 mg for one dose.  Assess clearance by vancomycin random level on 9/9 @ 0600  Pharmacy will continue to monitor renal function, cultures and sensitivities, and clinical status to adjust regimen as necessary.    Thank you for the " consult,    Gordon Benoit, DianelysD, BCPS  09/08/23 18:46 EDT

## 2023-09-08 NOTE — CASE MANAGEMENT/SOCIAL WORK
Discharge Planning Assessment  [facility]     Patient Name: Aron Jacobsen  MRN: [id number]  Today's Date: 9/8/2023    Admit Date: 9/7/2023    Plan: DCP   Discharge Needs Assessment       Row Name 09/08/23 1450       Living Environment    People in Home alone    Current Living Arrangements apartment    Potentially Unsafe Housing Conditions none    Primary Care Provided by self    Provides Primary Care For no one    Family Caregiver if Needed child(natan), adult    Quality of Family Relationships supportive;involved;helpful    Able to Return to Prior Arrangements yes       Resource/Environmental Concerns    Resource/Environmental Concerns none    Transportation Concerns none       Food Insecurity    Within the past 12 months, you worried that your food would run out before you got the money to buy more. Never true    Within the past 12 months, the food you bought just didn't last and you didn't have money to get more. Never true       Transition Planning    Patient/Family Anticipates Transition to home    Patient/Family Anticipated Services at Transition home health care    Transportation Anticipated family or friend will provide       Discharge Needs Assessment    Readmission Within the Last 30 Days no previous admission in last 30 days    Equipment Currently Used at Home walker, standard    Concerns to be Addressed discharge planning    Anticipated Changes Related to Illness none    Equipment Needed After Discharge oxygen    Outpatient/Agency/Support Group Needs homecare agency    Discharge Facility/Level of Care Needs home with home health    Provided Post Acute Provider List? N/A    Provided Post Acute Provider Quality & Resource List? N/A                   Discharge Plan       Row Name 09/08/23 1451       Plan    Plan DCP    Patient/Family in Agreement with Plan yes    Provided Post Acute Provider List? N/A    Provided Post Acute Provider Quality & Resource List? N/A    Plan Comments SW met with Pt. at  bedside to complete DCP. SW confirmed demographics. Pt. loves at home alone and uses a walker for mobility. Pt. states that she does not have home oxygen. Pt. does not have vendor preference if she needs home oxygen. Pt. states that she uses [facility]. Pt. does not have a POA or AD. Pt. plans to return home alone with HH. Pt. states that she has used HH prior with [facility]. Pt. states that she is willing to use HH services prior. Pt. has been to STR in [place] prior and states that she would go back if needed. Pt. states that her daughter drives her when needed. SW/CM will continue to follow for discharge planning.                  Continued Care and Services - Admitted Since 9/7/2023    Coordination has not been started for this encounter.          Demographic Summary       Row Name 09/08/23 1447       General Information    Admission Type inpatient    Arrived From emergency department    Required Notices Provided Important Message from Medicare    Referral Source admission list    Reason for Consult discharge planning    Preferred Language English       Contact Information    Permission Granted to Share Info With     Contact Information Obtained for                    Functional Status       Row Name 09/08/23 1448       Functional Status    Usual Activity Tolerance good    Current Activity Tolerance moderate       Physical Activity    On average, how many days per week do you engage in moderate to strenuous exercise (like a brisk walk)? 0 days    On average, how many minutes do you engage in exercise at this level? 0 min    Number of minutes of exercise per week 0       Assessment of Health Literacy    How often do you have someone help you read hospital materials? Occasionally    How often do you have problems learning about your medical condition because of difficulty understanding written information? Occasionally    How often do you have a problem understanding what is told  to you about your medical condition? Occasionally    How confident are you filling out medical forms by yourself? Quite a bit    Health Literacy Good       Functional Status, IADL    Medications independent    Meal Preparation assistive equipment    Housekeeping assistive equipment    Laundry assistive equipment    Shopping assistive equipment       Mental Status Summary    Recent Changes in Mental Status/Cognitive Functioning no changes       Employment/    Employment Status retired    Current or Previous Occupation not applicable                   Psychosocial       Row Name 09/08/23 1449       Values/Beliefs    Spiritual, Cultural Beliefs, Restorationism Practices, Values that Affect Care no       Coping/Stress    Major Change/Loss/Stressor illness    Patient Personal Strengths resilient    Sources of Support adult child(natan);other family members    Techniques to [place] with Loss/Stress/Change not applicable    Reaction to Health Status realistic    Understanding of Condition and Treatment adequate understanding of treatment       Developmental Stage (Manueliksson's)    Developmental Stage Stage 8 (66 years-death/Late Adulthood) Integrity vs. Despair       C-SSRS (Recent)    Q1 Wished to be Dead (Past Month) no    Q2 Suicidal Thoughts (Past Month) no    Q6 Suicide Behavior (Lifetime) no       Violence Risk    Feels Like Hurting Others no    Previous Attempt to Harm Others no                   Abuse/Neglect       Row Name 09/08/23 1449       Personal Safety    Feels Unsafe at Home or Work/School no    Feels Threatened by Someone no    Does Anyone Try to Keep You From Having Contact with Others or Doing Things Outside Your Home? no    Physical Signs of Abuse Present no                   Legal    No documentation.                  Substance Abuse    No documentation.                  Patient Forms    No documentation.                     Braulio De Santiago

## 2023-09-08 NOTE — THERAPY EVALUATION
Patient Name: Aron Jacobsen  : 1950    MRN: [id number]                              Today's Date: 2023       Admit Date: 2023    Visit Dx:     ICD-10-CM ICD-9-CM   1. Hypervolemia, unspecified hypervolemia type  E87.70 276.69     Patient Active Problem List   Diagnosis   • History of total left hip arthroplasty   • Closed fracture of head of left femur   • Fluid overload     Past Medical History:   Diagnosis Date   • Anemia    • Arthritis    • Baker's cyst    • Chronic deep vein thrombosis (DVT) of left upper extremity    • IBS (irritable colon syndrome)    • Impaired mobility    • Osteoporosis    • Rheumatoid arthritis    • Vitamin D deficiency      Past Surgical History:   Procedure Laterality Date   • BREAST BIOPSY     • BREAST SURGERY     • HYSTERECTOMY     • SHOULDER SURGERY Right    • VERTEBROPLASTY        General Information     Row Name 23 1152          Physical Therapy Time and Intention    Document Type evaluation  -MS     Mode of Treatment physical therapy  -MS     Row Name 23 1152          General Information    Patient Profile Reviewed yes  -MS     Prior Level of Function independent:;all household mobility  -MS     Existing Precautions/Restrictions fall  -MS     Barriers to Rehab medically complex;previous functional deficit  -MS     Row Name 23 1152          Living Environment    People in Home alone  -MS     Row Name 23 1152          Home Main Entrance    Number of Stairs, Main Entrance none  -MS     Row Name 23 1152          Cognition    Orientation Status (Cognition) oriented x 4  -MS     Row Name 23 1152          Safety Issues, Functional Mobility    Impairments Affecting Function (Mobility) balance;endurance/activity tolerance;strength;pain;shortness of breath;muscle tone abnormal;postural/trunk control;motor planning;motor control  -MS           User Key  (r) = Recorded By, (t) = Taken By, (c) = Cosigned By    Initials Name Provider Type     Zachary Diego, PT Physical Therapist               Mobility     Row Name 09/08/23 1154          Bed Mobility    Bed Mobility supine-sit  -MS     All Activities, [place] (Bed Mobility) 2 person assist;maximum assist (25% patient effort)  -MS     Row Name 09/08/23 1154          Transfers    Comment, (Transfers) unable to t/f from sitting  -MS           User Key  (r) = Recorded By, (t) = Taken By, (c) = Cosigned By    Initials Name Provider Type    Zachary Diego PT Physical Therapist               Obj/Interventions     Row Name 09/08/23 1155          Range of Motion Comprehensive    General Range of Motion lower extremity range of motion deficits identified  -MS     Row Name 09/08/23 1155          Strength Comprehensive (MMT)    General Manual Muscle Testing (MMT) Assessment lower extremity strength deficits identified  -MS     Comment, General Manual Muscle Testing (MMT) Assessment B LE 2+/5  -MS     Row Name 09/08/23 1155          Balance    Balance Assessment sitting static balance  -MS     Static Sitting Balance minimal assist  -MS     Position, Sitting Balance unsupported  -MS     Comment, Balance Pt sat EOB for 5 mins with Isela  -MS     Row Name 09/08/23 1155          Sensory Assessment (Somatosensory)    Sensory Assessment (Somatosensory) sensation intact  -MS           User Key  (r) = Recorded By, (t) = Taken By, (c) = Cosigned By    Initials Name Provider Type    Zachary Diego, PT Physical Therapist               Goals/Plan     Row Name 09/08/23 1207          Bed Mobility Goal 1 (PT)    Activity/Assistive Device (Bed Mobility Goal 1, PT) bed mobility activities, all  -MS     [place] Level/Cues Needed (Bed Mobility Goal 1, PT) modified independence  -MS     Time Frame (Bed Mobility Goal 1, PT) long term goal (LTG);2 weeks  -MS     Row Name 09/08/23 1207          Transfer Goal 1 (PT)    Activity/Assistive Device (Transfer Goal 1, PT) transfers, all;sit-to-stand/stand-to-sit  -MS      [place] Level/Cues Needed (Transfer Goal 1, PT) contact guard required  -MS     Time Frame (Transfer Goal 1, PT) long term goal (LTG);2 weeks  -MS     Row Name 09/08/23 1207          Gait Training Goal 1 (PT)    Activity/Assistive Device (Gait Training Goal 1, PT) gait (walking locomotion);assistive device use  -MS     [place] Level (Gait Training Goal 1, PT) standby assist  -MS     Distance (Gait Training Goal 1, PT) 100'  -MS     Time Frame (Gait Training Goal 1, PT) long term goal (LTG);2 weeks  -MS     Row Name 09/08/23 1207          Patient Education Goal (PT)    Activity (Patient Education Goal, PT) ther exer x15 reps on B LE  -MS     [place]/Cues/Accuracy (Memory Goal 2, PT) demonstrates adequately  -MS     Time Frame (Patient Education Goal, PT) long term goal (LTG);2 weeks  -MS     Progress/Outcome (Patient Education Goal, PT) good progress toward goal;continuing progress toward goal  -MS     Row Name 09/08/23 1207          Therapy Assessment/Plan (PT)    Planned Therapy Interventions (PT) balance training;bed mobility training;gait training;home exercise program;transfer training;ROM (range of motion);stair training;strengthening;patient/family education;neuromuscular re-education  -MS           User Key  (r) = Recorded By, (t) = Taken By, (c) = Cosigned By    Initials Name Provider Type    Zachary Diego, PT Physical Therapist               Clinical Impression     Row Name 09/08/23 1159          Pain    Pretreatment Pain Rating 7/10  -MS     Posttreatment Pain Rating 7/10  -MS     Pain Location - chest  -MS     Pre/Posttreatment Pain Comment pt states she has had this chest pain for a while  -MS     Row Name 09/08/23 1159          Plan of Care Review    Plan of Care Reviewed With patient  -MS     Progress no change  -MS     Outcome Evaluation Pt participated in PT eval this date. Pt reports she lives alone and is normall independent with use of rollator. Pt transferred to EOB this  date with maxA x2. Pt was unable to maintain static sitting balance without Isela. Pt leaning posteriorly, significant kyphosis and poor upright posture. Pt sat EOb for 5 mins however unable to progress mobility this date due to imapired strength. Pt returned to supine with maxA x2. Pt is expected to benefit from skilled PTx during this inpatient stay and was open to STR at discharge.  -MS     Row Name 09/08/23 1159          Therapy Assessment/Plan (PT)    Patient/Family Therapy Goals Statement (PT) to get stronger  -MS     Rehab Potential (PT) good, to achieve stated therapy goals  -MS     Criteria for Skilled Interventions Met (PT) yes;meets criteria  -MS     Therapy Frequency (PT) 5 times/wk  -MS     Row Name 09/08/23 1159          Vital Signs    Pre SpO2 (%) 92  -MS     O2 Delivery Pre Treatment supplemental O2  -MS     O2 Delivery Intra Treatment supplemental O2  -MS     Post SpO2 (%) 94  -MS     O2 Delivery Post Treatment supplemental O2  -MS     Pre Patient Position Supine  -MS     Intra Patient Position Sitting  -MS     Post Patient Position Supine  -MS     Row Name 09/08/23 1159          Positioning and Restraints    Pre-Treatment Position in bed  -MS     Post Treatment Position bed  -MS     In Bed fowlers;call light within reach;encouraged to call for assist;exit alarm on  -MS           User Key  (r) = Recorded By, (t) = Taken By, (c) = Cosigned By    Initials Name Provider Type    Zachary Diego, PT Physical Therapist               Outcome Measures     Row Name 09/08/23 8491          How much help from another person do you currently need...    Turning from your back to your side while in flat bed without using bedrails? 2  -MS     Moving from lying on back to sitting on the side of a flat bed without bedrails? 2  -MS     Moving to and from a bed to a chair (including a wheelchair)? 1  -MS     Standing up from a chair using your arms (e.g., wheelchair, bedside chair)? 1  -MS     Climbing 3-5 steps  with a railing? 1  -MS     To walk in hospital room? 1  -MS     AM-PAC 6 Clicks Score (PT) 8  -MS     Highest level of mobility 3 --> Sat at edge of bed  -MS     Row Name 09/08/23 1207          Functional Assessment    Outcome Measure Options AM-PAC 6 Clicks Basic Mobility (PT)  -MS           User Key  (r) = Recorded By, (t) = Taken By, (c) = Cosigned By    Initials Name Provider Type    MS Zachary Saha PT Physical Therapist                             Physical Therapy Education     Title: PT OT SLP Therapies (In Progress)     Topic: Physical Therapy (In Progress)     Point: Mobility training (Done)     Learning Progress Summary           Patient Acceptance, E, VU by MS at 9/8/2023 1208    Comment: importance of mobility                   Point: Home exercise program (Done)     Learning Progress Summary           Patient Acceptance, E, VU by MS at 9/8/2023 1208    Comment: importance of mobility                   Point: Body mechanics (Not Started)     Learner Progress:  Not documented in this visit.          Point: Precautions (Not Started)     Learner Progress:  Not documented in this visit.                      User Key     Initials Effective Dates Name Provider Type Discipline    MS 08/22/23 -  Zachary Saha PT Physical Therapist PT              PT Recommendation and Plan  Planned Therapy Interventions (PT): balance training, bed mobility training, gait training, home exercise program, transfer training, ROM (range of motion), stair training, strengthening, patient/family education, neuromuscular re-education  Plan of Care Reviewed With: patient  Progress: no change  Outcome Evaluation: Pt participated in PT eval this date. Pt reports she lives alone and is normall independent with use of rollator. Pt transferred to EOB this date with maxA x2. Pt was unable to maintain static sitting balance without Isela. Pt leaning posteriorly, significant kyphosis and poor upright posture. Pt sat EOb for 5 mins  however unable to progress mobility this date due to imapired strength. Pt returned to supine with maxA x2. Pt is expected to benefit from skilled PTx during this inpatient stay and was open to STR at discharge.     Time Calculation:   PT Evaluation Complexity  History, PT Evaluation Complexity: 1-2 personal factors and/or comorbidities  Examination of Body Systems (PT Eval Complexity): total of 3 or more elements  Clinical Presentation (PT Evaluation Complexity): evolving  Clinical Decision Making (PT Evaluation Complexity): moderate complexity  Overall Complexity (PT Evaluation Complexity): moderate complexity     PT Charges     Row Name 09/08/23 1208             Time Calculation    Start Time 0945  -MS      PT Received On 09/08/23  -MS      PT Goal Re-Cert Due Date 09/18/23  -MS         Untimed Charges    PT Eval/Re-eval Minutes 45  -MS         Total Minutes    Untimed Charges Total Minutes 45  -MS       Total Minutes 45  -MS            User Key  (r) = Recorded By, (t) = Taken By, (c) = Cosigned By    Initials Name Provider Type    MS Zachary Saha, PT Physical Therapist              Therapy Charges for Today     Code Description Service Date Service Provider Modifiers Qty    [id number] HC PT EVAL MOD COMPLEXITY 3 9/8/2023 Zachary Saha, PT GP 1          PT G-Codes  Outcome Measure Options: AM-PAC 6 Clicks Basic Mobility (PT)  AM-PAC 6 Clicks Score (PT): 8  PT Discharge Summary  Anticipated Discharge Disposition (PT): home with assist, home with home health    Zachary Saha PT  9/8/2023

## 2023-09-09 LAB — MRSA DNA SPEC QL NAA+PROBE: NORMAL

## 2023-09-09 RX ORDER — CLINDAMYCIN PHOSPHATE 600 MG/50ML
600 INJECTION INTRAVENOUS EVERY 8 HOURS
Status: DISCONTINUED | OUTPATIENT
Start: 2023-09-09 | End: 2023-09-09 | Stop reason: HOSPADM

## 2023-09-09 RX ORDER — CLINDAMYCIN PHOSPHATE 600 MG/50ML
600 INJECTION INTRAVENOUS EVERY 8 HOURS
Qty: 750 ML | Refills: 0 | Status: SHIPPED | OUTPATIENT
Start: 2023-09-09 | End: 2023-09-14

## 2023-09-09 RX ORDER — CEFEPIME HYDROCHLORIDE 2 G/50ML
2000 INJECTION, SOLUTION INTRAVENOUS EVERY 8 HOURS
Qty: 750 ML | Refills: 0
Start: 2023-09-09 | End: 2023-09-14

## 2023-09-09 RX ORDER — ONDANSETRON 4 MG/1
4 TABLET, FILM COATED ORAL EVERY 6 HOURS PRN
Start: 2023-09-09

## 2023-09-09 RX ORDER — CEFEPIME HYDROCHLORIDE 2 G/50ML
2000 INJECTION, SOLUTION INTRAVENOUS ONCE
Qty: 50 ML | Refills: 0 | Status: SHIPPED | OUTPATIENT
Start: 2023-09-09 | End: 2023-09-09

## 2023-09-09 NOTE — NURSING NOTE
1900: Patient has no IV access, Hospitalist aware. He is reaching out to see if we can get a central line placed, states he will keep me updated.  1930: Critical lactate of 3.0 called. Dr. Ordaz notified. No new orders.  2000: Patient refusing labs, education completed on the importance of these labs. Dr. Ordaz notified.  2023: Dr. Ordaz at bedside with patient discussing the importance of her labs and family on the phone. He states he is going to try to get her transferred to higher level of care.  2102: Assessment of patient completed, Left hand cool to touch, L upper arm area where IV infiltrated is red and hot to touch. Palpable pulses weak, doppler pulses weak +1. Large amounts of serosanguineous drainage noted, dressing changed and arm cleaned. Pulses marked. Patient reports no numbness or tingling in her L fingers, tenderness is present.   2233: Contacted Dr. Ordaz to follow up about central line placement, he informed me he was still working on it.   2343: Patient was accepted at [facility].  0010: Contacted Dr. Ordaz about the IV antibiotics ordered, asking if he was okay with not administering them due to loss of IV access before being transferred, he stated that transfer was highest priority at this time.   0020: Report called to [facility], transportation notified.  0200: Patient was transferred by [facility] air to [facility].

## 2023-09-09 NOTE — DISCHARGE SUMMARY
[facility]   DISCHARGE SUMMARY      Name:  Aron Jacobsen   Age:  73 y.o.  Sex:  female  :  1950  MRN:  [id number]   Visit Number:  [id number]    Admission Date:  2023  Date of Discharge:  2023  Primary Care Physician:  Diane Good MD    Important issues to note:    Start: Cefepime, clindamycin, sodium bicarbonate, vancomycin  Stop: Nothing  Follow up: PCP in 1 week  Brief Summary: Presented with swelling and shortness of breath due to volume overload from undetermined cause and possible pneumonia. Initially had been continued on diuretics and antibiotics however had developed a left upper extremity DVT and concerns for necrotizing fasciitis.  Also there was concern for HIT.  After discussion with our general surgery physician on-call Dr. Sears she had recommended initiating transfer to higher level of care.      Discharge Diagnoses:   Necrotizing fasciitis  Left upper extremity DVT  Thrombocytopenia    Fluid overload        Problem List:     Active Hospital Problems    Diagnosis  POA    **Fluid overload [E87.70]  Yes      Resolved Hospital Problems   No resolved problems to display.     Presenting Problem:    Chief Complaint   Patient presents with    Leg Swelling    Weakness - Generalized      Consults:     Consulting Physician(s)       Provider Relationship Specialty    Warren Hoff MD, TIEN Consulting Physician Nephrology    Juvenal Thayer MD Consulting Physician Cardiology          Procedures Performed:        History Of Presenting Illness:      Patient is a 73-year-old woman, lives at home alone, normally can ambulate but does not drive, with a past medical history of hypertension, rheumatoid arthritis, migraines.  Was evaluated in 2023 at  for bilateral lower extremity swelling, venous duplex was normal at that time, she was encouraged to elevate and use compression stockings.  Presented to [facility] ED on 2023 with concern for  bilateral pedal edema, abdominal discomfort, diaphoresis, back pain, reportedly increasingly worse over several weeks.  Her shortness of breath in particular is worse in the past 4 to 5 days.  Denied any fevers or chills, vomiting.     ED summary: Afebrile, vital signs stable on 3 L nasal cannula.  EKG sinus tachycardia rate 118, no ST elevations or depressions.  High-sensitivity troponin 68, ACS not initially suspected.  proBNP over 1500.  Sodium 133, potassium 5.3, CO2 11, anion gap 23, BUN 47, creatinine 1.42 with most recent 0.45 in December 2019, CRP 8.19, procalcitonin 0.70, white count 12, hemoglobin 15.9.  COVID/flu negative.  CXR mild congestive heart failure.  Suspected new onset heart failure with hypoxia and bilateral lower extremity edema.  Also possible patient may have had a cardiac event weeks prior to her edema starting.  She is Lasix naïve, was provided 40 mg IV, also provided Rocephin with elevated procalcitonin.         Hospital course:  9/8/2023 update: Was continued on IV antibiotics and diuretics the first day of admission.  Still required nasal cannula but less then initially required.  Went from 3 L to 1 L.  Continued to be tachycardic with a heart rate of 113.  Thrombocytopenic as well.  Had difficulty with access and had pain around left AC IV site.  This progressed to redness and swelling.  Ultrasound imaging confirmed DVT.  CT imaging was concerning for necrotizing fasciitis.  Therefore transfer was initiated.    Necrotizing fasciitis  Left upper extremity DVT  -We will attempt to transfer's for surgical assessment, platelets were 52,000 on last check patient is refusing repeat check currently.  If repeat is above 50,000 we will consider dose with Lovenox.  This will also depend on whether or not she is going for surgery. Started vancomycin, clindamycin cefepime.    Hypoxia  CHF, suspected  Volume overload  Bacterial pneumonia, suspected     Supplemental oxygen as needed keep saturation  above 90%.  Echocardiogram good systolic function and indeterminate diastolic dysfunction, likely does have diastolic heart failure.  Lasix.  Rocephin and azithromycin started 9/7.  Cardiology consultation, recommendations appreciated; agreed with diuresis and bilateral lower extremity swelling is likely multifactorial with venous insufficiency and heart failure.  Cardiology recommends outpatient follow-up for noninvasive ischemic evaluation.  Update 9/8/2023: Given concern for acute infection and increasing lactic acid we will hold diuretics.  Added vancomycin check MRSA swab of the naris.     RENE  Nephrology consultation, recommendations appreciated.  Patient was started on sodium bicarbonate.     Impaired Mobility and ADLs  PT/OT     Thrombocytopenia  Peripheral smear ordered. DC'd heparin.      Chronic:  hypertension, rheumatoid arthritis, migraines    Vital Signs:    Temp:  [97.2 °F (36.2 °C)-98.4 °F (36.9 °C)] 97.9 °F (36.6 °C)  Heart Rate:  [] 113  Resp:  [16-20] 18  BP: (103-127)/(67-92) 103/88    Physical Exam:    General Appearance:  Alert and cooperative.    Head:  Atraumatic and normocephalic.   Eyes: Conjunctivae and sclerae normal, no icterus. No pallor.   Throat: No oral lesions, no thrush, oral mucosa moist.   Neck: Supple, trachea midline, no thyromegaly.   Lungs:   Breath sounds heard bilaterally equally.  No wheezing or crackles. No Pleural rub or bronchial breathing.   Heart:  Normal S1 and S2, no murmur, no gallop, no rub. No JVD.   Abdomen:   Normal bowel sounds, no masses, no organomegaly. Soft, nontender, nondistended, no rebound tenderness.   Extremities: Bilateral lower extremity pitting edema, noted left upper extremity swelling worse in the antecubital fossa extending into the mid arm and mid forearm associated with redness and warmth.   Skin: Warm.   Neurologic: Alert and oriented x 3. No facial asymmetry. Moves all four limbs. No tremors.        Pertinent Lab Results:     Results  from last 7 days   Lab Units 09/08/23  0618 09/07/23  1416   SODIUM mmol/L 133* 133*   POTASSIUM mmol/L 5.4* 5.3*   CHLORIDE mmol/L 96* 98   CO2 mmol/L 12.3* 11.3*   BUN mg/dL 51* 47*   CREATININE mg/dL 1.38* 1.42*   CALCIUM mg/dL 9.9 10.0   BILIRUBIN mg/dL  --  2.0*   ALK PHOS U/L  --  172*   ALT (SGPT) U/L  --  22   AST (SGOT) U/L  --  37*   GLUCOSE mg/dL 100* 109*     Results from last 7 days   Lab Units 09/08/23  0618 09/07/23  1111   WBC 10*3/mm3 12.58* 12.63*   HEMOGLOBIN g/dL 15.3 15.9   HEMATOCRIT % 45.7 49.2*   PLATELETS 10*3/mm3  --  52*         Results from last 7 days   Lab Units 09/08/23  1827 09/07/23  1740 09/07/23  1416   CK TOTAL U/L 75  --   --    HSTROP T ng/L  --  71* 68*     Results from last 7 days   Lab Units 09/07/23  1416   PROBNP pg/mL 1,515.0*                 Results from last 7 days   Lab Units 09/07/23  0600   BLOODCX  No growth at 24 hours       Pertinent Radiology Results:    Imaging Results (All)       Procedure Component Value Units Date/Time    CT Upper Extremity Left Without Contrast [[id number]] Collected: 09/08/23 2006     Updated: 09/08/23 2008    Narrative:      FINAL REPORT    TECHNIQUE:  CT images of the left upper extending were obtained.    CLINICAL HISTORY:  Soft tissue infection suspected, elbow, no prior imaging    FINDINGS:  There is subcutaneous fat stranding which is particularly severe  in the soft tissues of the antecubital region where multiple  bubbles of air are seen.  This is worrisome for necrotizing  fasciitis.  There is no joint effusion.  There is no osseous  abnormality.      Impression:      Abnormal inflammation involving the deep subcutaneous tissue  with multiple air bubbles worrisome for necrotizing fasciitis.    Authenticated and Electronically Signed by Dominick Gaitan M.D. on  09/08/2023 08:07:10 PM    US Venous Doppler Upper Extremity Left (duplex) [[id number]] Resulted: 09/08/23 1820     Updated: 09/08/23 1902    US Renal Limited [[id number]] Resulted:  09/08/23 1748     Updated: 09/08/23 1754    XR Chest 1 View [[id number]] Collected: 09/07/23 1151     Updated: 09/07/23 1155    Narrative:      CLINICAL INDICATION:    SOB, c/f CHF shortness of breath.     EXAMINATION TECHNIQUE:  XR CHEST 1 VW-     COMPARISON:  Chest radiograph 6/23/2018.     FINDINGS:  Stable mild cardiomegaly. Aortic atherosclerosis, tortuosity and  ectasia. Bilateral hilar fullness, likely enlarged pulmonary  vasculature. No overt edema. Mildly prominent interstitial markings.  Small bilateral pleural effusions, left greater than right. Bibasilar  atelectasis. No pneumothorax. Right reverse shoulder arthroplasty  hardware in place. There is subtle periosteal reaction along the right  humeral mid diaphysis surrounding the tip of the humeral stem component,  suggestive of stress reaction. Severe left glenohumeral joint  degenerative changes.       Impression:      Mild congestive heart failure.     Images personally reviewed, interpreted and dictated by MARK James.              This report was signed and finalized on 9/7/2023 11:53 AM by MARK James.               Echo:    Results for orders placed during the hospital encounter of 09/07/23    Adult Transthoracic Echo Complete w/ Color, Spectral and Contrast if necessary per protocol    Interpretation Summary  1.  Technically difficult study  2.  Normal left ventricular size and systolic function, LVEF 60-65%.  3.  Mild to moderate concentric LVH.  4.  Unable to fully assess diastolic function on current study.  5.  RV/RA not well visualized on current study.  6.  Normal left atrial size.  7.  No significant aortic, mitral, or pulmonic valve abnormalities.  Tricuspid valve not assessed.    Condition on Discharge:      Stable.    Code status during the hospital stay:    Code Status and Medical Interventions:   Ordered at: 09/07/23 1628     Medical Intervention Limits:    NO intubation (DNI)     Code Status (Patient has no pulse  and is not breathing):    No CPR (Do Not Attempt to Resuscitate)     Medical Interventions (Patient has pulse or is breathing):    Limited Support     Discharge Disposition:        Discharge Medications:     Your medication list        START taking these medications        Instructions Last Dose Given Next Dose Due   Cefepime-Dextrose 2-5 GM-%(50ML) IVPB  Commonly known as: MAXIPIME      Infuse 50 mL into a venous catheter 1 (One) Time for 1 dose.       Cefepime-Dextrose 2-5 GM-%(50ML) IVPB  Commonly known as: MAXIPIME      Infuse 50 mL into a venous catheter Every 8 (Eight) Hours for 15 doses. Indications: Infection of the Skin and/or Soft Tissue       clindamycin 600 MG/50ML IVPB  Commonly known as: CLEOCIN      Infuse 50 mL into a venous catheter Every 8 (Eight) Hours for 15 doses. Indications: Infection of the Skin and/or Soft Tissue       ondansetron 4 MG tablet  Commonly known as: ZOFRAN      Take 1 tablet by mouth Every 6 (Six) Hours As Needed for Nausea or Vomiting.       sodium bicarbonate 650 MG tablet      Take 2 tablets by mouth 3 (Three) Times a Day.              CONTINUE taking these medications        Instructions Last Dose Given Next Dose Due   acetaminophen-codeine 300-30 MG per tablet  Commonly known as: TYLENOL with CODEINE #3      Take 1 tablet by mouth 3 (Three) Times a Day.       Adalimumab 40 MG/0.4ML Pen-injector Kit  Commonly known as: HUMIRA      Inject 40 mg under the skin into the appropriate area as directed 1 (One) Time Per Week.       celecoxib 200 MG capsule  Commonly known as: CeleBREX      Take 1 capsule by mouth Daily.       cyclobenzaprine 10 MG tablet  Commonly known as: FLEXERIL      Take 1 tablet by mouth Every Night.       furosemide 20 MG tablet  Commonly known as: LASIX      Take 1 tablet by mouth Daily As Needed.       potassium chloride 20 MEQ CR tablet  Commonly known as: K-DUR,KLOR-CON      Take 1 tablet by mouth Daily.              ASK your doctor about these  medications        Instructions Last Dose Given Next Dose Due   vancomycin  Ask about: Should I take this medication?      Infuse 250 mL into a venous catheter 1 (One) Time for 1 dose. Indications: Infection of the Skin and/or Soft Tissue                 Where to Get Your Medications        These medications were sent to [id number] - [place], [place] - [address] - [phone]  - [phone]   [address] [address]      Phone: [phone]   Cefepime-Dextrose 2-5 GM-%(50ML) IVPB  clindamycin 600 MG/50ML IVPB       Information about where to get these medications is not yet available    Ask your nurse or doctor about these medications  Cefepime-Dextrose 2-5 GM-%(50ML) IVPB  ondansetron 4 MG tablet  sodium bicarbonate 650 MG tablet  vancomycin           Discharge Diet:       Activity at Discharge:       Follow-up Appointments:      No future appointments.  Test Results Pending at Discharge:    Pending Labs       Order Current Status    MRSA Screen, PCR (Inpatient) - Swab, Nares In process    PERIPHERAL SMEAR, [facility] In process    Blood Culture - Blood, Arm, Right Preliminary result    Wound Culture - Drainage, Arm, Left Preliminary result               Harinder Ordaz DO  09/08/23  20:58 EDT    Time: I spent 45 minutes on this discharge activity which included: face-to-face encounter with the patient, reviewing the data in the system, coordination of the care with the nursing staff as well as consultants, documentation, and entering orders.     Dictated utilizing Dragon dictation.

## 2023-09-09 NOTE — PLAN OF CARE
Goal Outcome Evaluation:            Pt being transferred to higher level of care at [facility].

## 2023-09-12 LAB
BACTERIA SPEC AEROBE CULT: NORMAL
BACTERIA SPEC AEROBE CULT: NORMAL
GRAM STN SPEC: NORMAL
REF LAB TEST METHOD: NORMAL